# Patient Record
Sex: MALE | Race: WHITE | NOT HISPANIC OR LATINO | Employment: OTHER | URBAN - METROPOLITAN AREA
[De-identification: names, ages, dates, MRNs, and addresses within clinical notes are randomized per-mention and may not be internally consistent; named-entity substitution may affect disease eponyms.]

---

## 2019-10-02 ENCOUNTER — HOSPITAL ENCOUNTER (OUTPATIENT)
Dept: SLEEP MEDICINE | Facility: HOSPITAL | Age: 54
Discharge: HOME | End: 2019-10-02
Attending: INTERNAL MEDICINE

## 2019-10-02 ENCOUNTER — OUTSIDE FACILITY SERVICE (OUTPATIENT)
Dept: SLEEP MEDICINE | Facility: HOSPITAL | Age: 54
End: 2019-10-02

## 2019-10-02 PROCEDURE — 99204 OFFICE O/P NEW MOD 45 MIN: CPT | Performed by: INTERNAL MEDICINE

## 2019-10-23 ENCOUNTER — HOSPITAL ENCOUNTER (OUTPATIENT)
Dept: SLEEP MEDICINE | Facility: HOSPITAL | Age: 54
Discharge: HOME | End: 2019-10-23
Attending: INTERNAL MEDICINE

## 2019-10-28 ENCOUNTER — OUTSIDE FACILITY SERVICE (OUTPATIENT)
Dept: SLEEP MEDICINE | Facility: HOSPITAL | Age: 54
End: 2019-10-28

## 2019-10-28 PROCEDURE — 95811 POLYSOM 6/>YRS CPAP 4/> PARM: CPT | Performed by: INTERNAL MEDICINE

## 2019-12-23 ENCOUNTER — OUTSIDE FACILITY SERVICE (OUTPATIENT)
Dept: SLEEP MEDICINE | Facility: HOSPITAL | Age: 54
End: 2019-12-23

## 2019-12-23 ENCOUNTER — HOSPITAL ENCOUNTER (OUTPATIENT)
Dept: SLEEP MEDICINE | Facility: HOSPITAL | Age: 54
Discharge: HOME | End: 2019-12-23
Attending: INTERNAL MEDICINE

## 2019-12-23 PROCEDURE — 99213 OFFICE O/P EST LOW 20 MIN: CPT | Performed by: INTERNAL MEDICINE

## 2020-03-09 ENCOUNTER — CONVERSION ENCOUNTER (OUTPATIENT)
Dept: SURGERY | Facility: CLINIC | Age: 55
End: 2020-03-09

## 2020-03-09 ENCOUNTER — OFFICE VISIT CONVERTED (OUTPATIENT)
Dept: SURGERY | Facility: CLINIC | Age: 55
End: 2020-03-09
Attending: SURGERY

## 2020-04-29 ENCOUNTER — CONVERSION ENCOUNTER (OUTPATIENT)
Dept: CARDIOLOGY | Facility: CLINIC | Age: 55
End: 2020-04-29

## 2020-04-29 ENCOUNTER — OFFICE VISIT CONVERTED (OUTPATIENT)
Dept: CARDIOLOGY | Facility: CLINIC | Age: 55
End: 2020-04-29
Attending: INTERNAL MEDICINE

## 2020-07-20 ENCOUNTER — HOSPITAL ENCOUNTER (OUTPATIENT)
Dept: GASTROENTEROLOGY | Facility: HOSPITAL | Age: 55
Discharge: HOME | End: 2020-07-20
Attending: SURGERY

## 2020-07-20 LAB — SARS-COV-2 RNA SPEC QL NAA+PROBE: NOT DETECTED

## 2020-07-22 ENCOUNTER — HOSPITAL ENCOUNTER (OUTPATIENT)
Dept: GASTROENTEROLOGY | Facility: HOSPITAL | Age: 55
Setting detail: HOSPITAL OUTPATIENT SURGERY
Discharge: HOME | End: 2020-07-22
Attending: SURGERY

## 2020-09-02 ENCOUNTER — HOSPITAL ENCOUNTER (OUTPATIENT)
Dept: GENERAL RADIOLOGY | Facility: HOSPITAL | Age: 55
Discharge: HOME | End: 2020-09-02
Attending: NURSE PRACTITIONER

## 2020-09-10 ENCOUNTER — OFFICE VISIT CONVERTED (OUTPATIENT)
Dept: ORTHOPEDIC SURGERY | Facility: CLINIC | Age: 55
End: 2020-09-10
Attending: ORTHOPAEDIC SURGERY

## 2020-10-02 ENCOUNTER — HOSPITAL ENCOUNTER (OUTPATIENT)
Dept: PREADMISSION TESTING | Facility: HOSPITAL | Age: 55
Discharge: HOME | End: 2020-10-02
Attending: ORTHOPAEDIC SURGERY

## 2020-10-05 LAB — SARS-COV-2 RNA SPEC QL NAA+PROBE: NOT DETECTED

## 2020-10-07 ENCOUNTER — HOSPITAL ENCOUNTER (OUTPATIENT)
Dept: PERIOP | Facility: HOSPITAL | Age: 55
Setting detail: HOSPITAL OUTPATIENT SURGERY
Discharge: HOME | End: 2020-10-07
Attending: ORTHOPAEDIC SURGERY

## 2020-10-22 ENCOUNTER — OFFICE VISIT CONVERTED (OUTPATIENT)
Dept: ORTHOPEDIC SURGERY | Facility: CLINIC | Age: 55
End: 2020-10-22
Attending: PHYSICIAN ASSISTANT

## 2020-11-12 ENCOUNTER — TELEPHONE CONVERTED (OUTPATIENT)
Dept: CARDIOLOGY | Facility: CLINIC | Age: 55
End: 2020-11-12
Attending: INTERNAL MEDICINE

## 2020-11-23 ENCOUNTER — HOSPITAL ENCOUNTER (OUTPATIENT)
Dept: CARDIOLOGY | Facility: HOSPITAL | Age: 55
Discharge: HOME | End: 2020-11-23
Attending: NURSE PRACTITIONER

## 2020-11-24 ENCOUNTER — OFFICE VISIT CONVERTED (OUTPATIENT)
Dept: ORTHOPEDIC SURGERY | Facility: CLINIC | Age: 55
End: 2020-11-24
Attending: ORTHOPAEDIC SURGERY

## 2020-12-17 ENCOUNTER — OFFICE VISIT CONVERTED (OUTPATIENT)
Dept: ORTHOPEDIC SURGERY | Facility: CLINIC | Age: 55
End: 2020-12-17
Attending: PHYSICIAN ASSISTANT

## 2021-03-09 ENCOUNTER — OFFICE VISIT CONVERTED (OUTPATIENT)
Dept: OTOLARYNGOLOGY | Facility: CLINIC | Age: 56
End: 2021-03-09
Attending: NURSE PRACTITIONER

## 2021-04-06 ENCOUNTER — OFFICE VISIT CONVERTED (OUTPATIENT)
Dept: OTOLARYNGOLOGY | Facility: CLINIC | Age: 56
End: 2021-04-06
Attending: NURSE PRACTITIONER

## 2021-05-10 ENCOUNTER — OFFICE VISIT CONVERTED (OUTPATIENT)
Dept: CARDIOLOGY | Facility: CLINIC | Age: 56
End: 2021-05-10
Attending: INTERNAL MEDICINE

## 2021-05-10 NOTE — H&P
History and Physical      Patient Name: Anival Arevalo   Patient ID: [id number]   Sex: Male   YOB: 1965    Primary Care Provider: Eun Carrillo NP   Referring Provider: Eun Carrillo NP    Visit Date: April 29, 2020    Provider: Tha Coppola MD   Location: [facility]   Location Address: [address] [place] [address], [facility]   [place], [place]  [id number]   Location Phone: [phone]          Chief Complaint  · Establish cardiac care.      History Of Present Illness  Consult requested by: Eun Carrillo NP   Anival Arevalo is a 55 year old /White male with known CAD and congestive heart failure who is establishing a cardiologist in the area. The patient has been doing well symptom-wise. Denies any new ongoing complaints. Still with intermittent chest discomfort, brief in duration. No anginal discomfort. Also, patient has stable chronic lower extremity edema and shortness of breath with activity.   PAST MEDICAL HISTORY: Coronary artery disease with an MI in 2013 with PCI and stenting in 2013 to the RCA and 2014; History of Hypertension; Dyslipidemia; Systolic congestive heart failure, most recent ejection fraction of 36% on the nuclear stress test in July 2019.   FAMILY MEDICAL HISTORY: Significant for premature coronary atherosclerotic disease on patient's dad's side.   PSYCHOSOCIAL HISTORY: Prior smoker. Does not use alcohol. Caffeine daily. . Admits mood changes and depression. Gets 7,500 steps per day plus.   CURRENT MEDICATIONS: Rosuvastatin 40 mg daily; indapamide 1.25 mg daily; Ventolin HFA p.r.n.; carvedilol 3.125 mg b.i.d.; montelukast 10 mg daily; sublingual nitroglycerin 0.4 mg p.r.n.; aspirin 81 mg daily; lisinopril 5 mg daily; vitamin D daily. The dosage and frequency of the medications were reviewed with the patient.   ALLERGIES: Zithromax/Z-Conrado.       Review of Systems  · Constitutional  o Admits  o : good general health lately  o Denies  o : fatigue, recent  "weight changes   · Eyes  o Admits  o : blurred vision  · HENT  o Denies  o : hearing loss or ringing, chronic sinus problem, swollen glands in neck  · Cardiovascular  o Admits  o : chest pain, swelling (feet, ankles, hands), shortness of breath with activities   o Denies  o : palpitations (fast, fluttering, or skipping beats)  · Respiratory  o Admits  o : asthma or wheezing, COPD  · Gastrointestinal  o Denies  o : ulcers, nausea or vomiting  · Neurologic  o Denies  o : lightheaded or dizzy, stroke, headaches  · Musculoskeletal  o Admits  o : joint pain, back pain  · Endocrine  o Denies  o : thyroid disease, diabetes, heat or cold intolerance, excessive thirst or urination  · Heme-Lymph  o Admits  o : bleeding or bruising tendency  o Denies  o : anemia      Vitals  Date Time BP Position Site L\R Cuff Size HR RR TEMP (F) WT  HT  BMI kg/m2 BSA m2 O2 Sat HC       04/29/2020 09:00 /86 Sitting    72 - R   223lbs 16oz 5'  8\" 34.06 2.21           Physical Examination  · Constitutional  o Appearance  o : Awake, alert, in no acute distress.   · Head and Face  o HEENT  o : PERRLA.  · Eyes  o Conjunctivae  o : Normal.  · Ears, Nose, Mouth and Throat  o Oral Cavity  o :   § Oral Mucosa  § : Normal.  · Neck  o Inspection/Palpation  o : No JVD.  · Respiratory  o Respiratory  o : Chest is symmetrical. Lung fields are clear. No rhonchi or wheezes heard.  · Cardiovascular  o Heart  o :   § Auscultation of Heart  § : S1, S2 normal. Regular rate and rhythm without murmurs, gallops, or rubs.  o Peripheral Vascular System  o :   § Extremities  § : Nails normal. No clubbing or cyanosis. Femoral pulses adequate. Pedal pulses adequate. No peripheral edema.  · Gastrointestinal  o Abdominal Examination  o : Abdomen soft. No masses. No guarding or rigidity. No hepatosplenomegaly. Bowel sounds normal.  · Musculoskeletal  o General  o :   § General Musculoskeletal  § : Muscle tone and strength were normal.  · Skin and Subcutaneous " Tissue  o General Inspection  o : Unremarkable.  · EKG  o EKG  o : Performed in the office today.  o Indications  o : Coronary artery disease.  o Results  o : Normal sinus rhythm with borderline nonspecific intraventricular conduction delay and evidence of old inferior wall MI.  o Comparison  o : No prior EKG to compare to.   · Diagnostic Testing  o Diagnostic Testing  o : Stress test done in July 2019 showed no reversible ischemic defect with an inferior apical infarct pattern, and on gated imaging, an EF of 36%. Heart catherization in 2013 showed a reduced ejection fraction of 40% and severe disease involving the RCA which was stented. There was nonobstructive disease in the LAD and circumflex arteries.           Assessment     ASSESSMENT & PLAN:    1.  Coronary artery disease with prior stenting.  No ongoing anginal chest discomfort.  Recent stress test in the        last year showing no ischemic defects, just prior infarct.  Recommended continued chronic aspirin 81 mg        once a day.  2.  Dyslipidemia, on high-intensity statin.  Still awaiting his lab results to see if further hyperlipidemia        medication is needed.  Patient sounds like he may have had a reaction to Zetia in the past before.  If so,        Repatha may be the only choice if further lowering needed.  3.  Congestive heart failure, ischemic in nature.  Ejection fraction has been borderline for ICD in the past on        stress testing reportedly and on echocardiogram done last year.  Will see if this can be obtained.  If not,        may need to reassess on followup visits in the future.             Electronically Signed by: Jackie Costa-, Other -Author on May 4, 2020 11:53:07 AM  Electronically Co-signed by: Tha Coppola MD -Reviewer on May 5, 2020 08:59:21 AM

## 2021-05-10 NOTE — H&P
History and Physical      Patient Name: Anival Arevalo   Patient ID: [id number]   Sex: Male   YOB: 1965    Primary Care Provider: Eun Carrillo NP   Referring Provider: Eun Carrillo NP    Visit Date: September 10, 2020    Provider: Rona Tsai MD   Location: [facility]   Location Address: [address] [place] [address]  [id number]   Location Phone: [phone]          Chief Complaint  · Left knee pain      History Of Present Illness  Anival Arevalo is a 55 year old /White male who presents today to [facility]. He is here for evaluation of his left knee. He has been having problems for the last 3 years, twisted his knee. He has lateral meniscal tear. He has had shots that have helped a little bit, but they reoccurred.       Past Medical History  Allergic rhinitis, chronic; Arthritis; Bladder Disorder; Cancer; Chest pain; Chronic Obstructive Pulmonary Disease; Congestive heart failure; GERD; Heart Attack; Heart Disease; High blood pressure; High cholesterol; Hyperlipemia; Hypertension; Kidney Disease; Limb Swelling; Lung disease; Seasonal allergies; Shortness Of Air; Shortness of Breath         Past Surgical History  Cardiac; Colonoscopy; Eye Implant; Gallbladder; heart surgery; Kidney         Medication List  aspirin 81 mg oral tablet,delayed release (DR/EC); carvedilol 3.125 mg oral tablet; indapamide 1.25 mg oral tablet; montelukast 10 mg oral tablet; Nitrostat 0.4 mg sublingual tablet, sublingual; rosuvastatin 40 mg oral tablet; Ventolin HFA 90 mcg/actuation inhalation HFA aerosol inhaler; Vitamin D3 125 mcg (5,000 unit) oral tablet         Allergy List  Zetia; Zithromax Z-Conrado         Family Medical History  Heart Disease; Cancer, Unspecified; Diabetes, unspecified type; Family history of Arthritis         Social History  Alcohol Use (Never); lives with children; lives with spouse; .; Recreational Drug Use (Never); Retired.; Tobacco (Former)  "        Review of Systems  · Constitutional  o Denies  o : fever, chills, weight loss  · Cardiovascular  o Denies  o : chest pain, shortness of breath  · Gastrointestinal  o Denies  o : liver disease, heartburn, nausea, blood in stools  · Genitourinary  o Denies  o : painful urination, blood in urine  · Integument  o Denies  o : rash, itching  · Neurologic  o Denies  o : headache, weakness, loss of consciousness  · Musculoskeletal  o Denies  o : painful, swollen joints  · Psychiatric  o Denies  o : drug/alcohol addiction, anxiety, depression      Vitals  Date Time BP Position Site L\R Cuff Size HR RR TEMP (F) WT  HT  BMI kg/m2 BSA m2 O2 Sat        09/10/2020 02:22 PM      96 - R   213lbs 16oz 5'  9\" 31.6 2.17 97 %          Physical Examination  · Constitutional  o Appearance  o : well developed, well-nourished, no obvious deformities present  · Head and Face  o Head  o :   § Inspection  § : normocephalic  o Face  o :   § Inspection  § : no facial lesions  · Eyes  o Conjunctivae  o : conjunctivae normal  o Sclerae  o : sclerae white  · Ears, Nose, Mouth and Throat  o Ears  o :   § External Ears  § : appearance within normal limits  § Hearing  § : intact  o Nose  o :   § External Nose  § : appearance normal  · Neck  o Inspection/Palpation  o : normal appearance  o Range of Motion  o : full range of motion  · Respiratory  o Respiratory Effort  o : breathing unlabored  o Inspection of Chest  o : normal appearance  o Auscultation of Lungs  o : no audible wheezing or rales  · Cardiovascular  o Heart  o : regular rate  · Gastrointestinal  o Abdominal Examination  o : soft and non-tender  · Skin and Subcutaneous Tissue  o General Inspection  o : intact, no rashes  · Psychiatric  o General  o : Alert and oriented x3  o Judgement and Insight  o : judgment and insight intact  o Mood and Affect  o : mood normal, affect appropriate  · Left Knee  o Inspection  o : Sensation intact. Pulse is normal. Affect is pleasant. He has " joint line tenderness. Positive Gabby.           Assessment  · Primary osteoarthritis of left knee     715.16/M17.12  · Pain: Left knee     719.46/M25.569      Plan  · Medications  o Medications have been Reconciled  o Transition of Care or Provider Policy  · Instructions  o Reviewed the patient's Past Medical, Social, and Family history as well as the ROS at today's visit, no changes.  o Call or return if worsening symptoms.  o Discussed surgery.  o Risks/benefits discussed with patient including, but not limited to: infection, bleeding, neurovascular damage, malunion, nonunion, aesthetic deformity, need for further surgery, and death.  o Surgery pamphlet given.  o This note is transcribed by Isha Trevizo /sriram  o Going to set him up for a knee scope and see him back after that.             Electronically Signed by: Isha Trevizo, -Author on September 11, 2020 10:02:27 AM  Electronically Co-signed by: Rona Tsai MD -Reviewer on September 11, 2020 05:23:09 PM

## 2021-05-10 NOTE — H&P
"   History and Physical      Patient Name: Anival Arevalo   Patient ID: [id number]   Sex: Male   YOB: 1965    Primary Care Provider: Eun Carrillo NP   Referring Provider: Eun Carrillo NP    Visit Date: March 9, 2021    Provider: RUI Morin   Location: [facility]   Location Address: [address] [place] [address], [address] [place] [address]  [id number]   Location Phone: [phone]          Chief Complaint     1.  Left ear fullness    2.  Hearing loss       History Of Present Illness  Anival Arevalo is a 55 year old /White male who presents to the office today as a consult from Eun Carrillo NP.      He presents the clinic today for evaluation of his left ear.  He states that for many years he has felt intermittent issues with fullness in his left ear.  He states that over the past 3 months he feels like this has gotten worse.  He states he can feel fluid moving around in his left ear and he feels as though he cannot hear as well out of that ear.  He states that he feels very congested in the morning when he wakes up but is usually not able to blow much out of his nose.  He states that he does feel like he has some hearing loss on the right side from loud noise exposure many years ago.  However, he has not had an audiogram in many years.  He states that he was using daily sinus rinses and felt like this was helping but stopped using this approximately a month ago.  Now he is using a saline gel spray in his nose each day.  He feels like his ear pops frequently and will occasionally \"clear\" but then returned to feeling clogged up again.  He is not on any intranasal steroid sprays.       Past Medical History  Allergic rhinitis, chronic; Arthritis; Bladder disorder; Cancer; Chest pain; Chronic Obstructive Pulmonary Disease; Chronic serous otitis media, left ear; Congestive heart failure; GERD; Heart Attack; Heart Disease; High blood pressure; High cholesterol; Hyperlipemia; " "Hypertension; Kidney Disease; Limb Swelling; Lung disease; Seasonal allergies; Shortness Of Air; Shortness of Breath         Past Surgical History  Cardiac; Colonoscopy; Eye Implant; Gallbladder; heart surgery; Kidney; Knee surgery         Medication List  aspirin 81 mg oral tablet,delayed release (DR/EC); carvedilol 3.125 mg oral tablet; diclofenac sodium 75 mg oral tablet,delayed release (DR/EC); hydrochlorothiazide 12.5 mg oral tablet; indapamide 1.25 mg oral tablet; lisinopril 5 mg oral tablet; montelukast 10 mg oral tablet; rosuvastatin 40 mg oral tablet; Vitamin D3 125 mcg (5,000 unit) oral tablet; Zyrtec 10 mg oral tablet         Allergy List  Zetia; Zithromax Z-Conrado         Family Medical History  Family history of Arthritis; Family history of cancer; Family history of heart disease; Family history of diabetes mellitus         Social History  Alcohol Use (Never); lives with children; lives with spouse; .; Recreational Drug Use (Never); Retired.; Tobacco (Former)         Review of Systems  · Constitutional  o Denies  o : fever, night sweats, weight loss  · Eyes  o Denies  o : discharge from eye, impaired vision  · HENT  o Admits  o : *See HPI  · Cardiovascular  o Denies  o : chest pain, irregular heart beats  · Respiratory  o Admits  o : wheezing  o Denies  o : shortness of breath, coughing up blood  · Gastrointestinal  o Denies  o : heartburn, reflux, vomiting blood  · Genitourinary  o Denies  o : frequency  · Integument  o Admits  o : skin dryness  o Denies  o : rash  · Neurologic  o Denies  o : seizures, loss of balance, loss of consciousness, dizziness  · Endocrine  o Admits  o : heat intolerance  o Denies  o : cold intolerance  · Heme-Lymph  o Admits  o : easy bleeding  o Denies  o : anemia      Vitals  Date Time BP Position Site L\R Cuff Size HR RR TEMP (F) WT  HT  BMI kg/m2 BSA m2 O2 Sat FR L/min FiO2 HC       03/09/2021 09:33 AM        97.6 221lbs 8oz 5'  9\" 32.71 2.21             Physical " Examination  · Constitutional  o Appearance  o : well developed, well-nourished, alert and in no acute distress, voice clear and strong  · Head and Face  o Head  o :   § Inspection  § : no deformities or lesions  o Face  o :   § Inspection  § : No facial lesions; House-Brackmann I/VI bilaterally  § Palpation  § : No TMJ crepitus nor  muscle tenderness bilaterally  · Eyes  o Vision  o :   § Visual Fields  § : Extraocular movements are intact. No spontaneous or gaze-induced nystagmus.  o Conjunctivae  o : clear  o Sclerae  o : clear  o Pupils and Irises  o : pupils equal, round, and reactive to light.   · Ears, Nose, Mouth and Throat  o Ears  o :   § External Ears  § : appearance within normal limits, no lesions present  § Otoscopic Examination  § : Left tympanoic membrane dull, serous effusion, right tympanic membrane normal in appearance, without perforation, middle ear well aerated  § Hearing  § : intact to conversational voice both ears. Tuning fork testing: Corado: No lateralization. Rinne: Positive bilaterally.  o Nose  o :   § External Nose  § : appearance normal  § Intranasal Exam  § : mucosa within normal limits, vestibules normal, no intranasal lesions present, septum midline, sinuses non tender to percussion  o Oral Cavity  o :   § Oral Mucosa  § : oral mucosa normal without pallor or cyanosis  § Lips  § : lip appearance normal  § Teeth  § : normal dentition for age  § Gums  § : gums pink, non-swollen, no bleeding present  § Tongue  § : tongue appearance normal; normal mobility  § Palate  § : hard palate normal, soft palate appearance normal with symmetric mobility  o Throat  o :   § Oropharynx  § : no inflammation or lesions present, tonsils within normal limits  · Neck  o Inspection/Palpation  o : normal appearance, no masses or tenderness, trachea midline; thyroid size normal, nontender, no nodules or masses present on palpation  · Respiratory  o Respiratory Effort  o : breathing  unlabored  o Inspection of Chest  o : normal appearance, no retractions  · Lymphatic  o Neck  o : no lymphadenopathy present  o Supraclavicular Nodes  o : no lymphadenopathy present  o Preauricular Nodes  o : no lymphadenopathy present  · Skin and Subcutaneous Tissue  o General Inspection  o : Regarding face and neck - there are no rashes present, no lesions present, and no areas of discoloration  · Neurologic  o Cranial Nerves  o : cranial nerves II-XII are grossly intact bilaterally  o Gait and Station  o : normal gait, able to stand without diffculty  · Psychiatric  o Judgement and Insight  o : judgment and insight intact  o Mood and Affect  o : mood normal, affect appropriate          Assessment  · Hearing loss     389.9/H91.90  · Acute serous otitis media of left ear without rupture     381.01/H65.02      Plan  · Orders  o Audiometry, pure-tone (threshold); [facility] (39699) - 389.9/H91.90, 381.01/H65.02 - 03/30/2021  o Tympanogram (Impedance Testing) [facility] ([address]281) - 389.9/H91.90, 381.01/H65.02 - 03/30/2021  · Medications  o fluticasone propionate 50 mcg/actuation nasal spray,suspension   SIG: spray 1 - 2 sprays (50 - 100 mcg) in each nostril by intranasal route once daily for 30 days   DISP: (1) Bottle with 0 refills  Prescribed on 03/09/2021     o prednisone 20 mg oral tablet   SIG: take 1 tablet (20 mg) by oral route 2 times per day for 5 days   DISP: (10) Tablet with 0 refills  Prescribed on 03/09/2021     o Medications have been Reconciled  o Transition of Care or Provider Policy  · Instructions  o He presents the clinic today for evaluation of his left ear. He states that for many years he has felt intermittent issues with fullness in his left ear. He states that over the past 3 months he feels like this has gotten worse. He states he can feel fluid moving around in his left ear and he feels as though he cannot hear as well out of that ear. He states that he feels very congested in the morning when he  "wakes up but is usually not able to blow much out of his nose. He states that he does feel like he has some hearing loss on the right side from loud noise exposure many years ago. However, he has not had an audiogram in many years. He states that he was using daily sinus rinses and felt like this was helping but stopped using this approximately a month ago. Now he is using a saline gel spray in his nose each day. He feels like his ear pops frequently and will occasionally \"clear\" but then returned to feeling clogged up again. He is not on any intranasal steroid sprays. On examination today left tympanic membrane is dull with what appears to be a serous effusion. Right tympanic membrane is normal in appearance without perforation. Middle ear is well aerated. Tuning fork testing is normal with Corado showing no lateralization and Rinne positive bilaterally. His nose is free of any polyps or purulence. He does not have any nasal obstruction today. Going to start him using his nasal saline rinses again each night and can add fluticasone to use after the rinse. I am going to have him use politzerization techniques to help open up the eustachian tube and clear the middle ear. I would like to see him back in 3 to 4 weeks to obtain an audiogram and tympanogram to assess his hearing loss and eustachian tube function.  o Electronically Identified Patient Education Materials Provided Electronically  · Correspondence  o ENT Letter to Referring MD (Eun Carrillo NP) - 03/09/2021            Electronically Signed by: RUI Morin -Author on March 9, 2021 10:34:45 AM  "

## 2021-05-13 NOTE — PROGRESS NOTES
"   Progress Note      Patient Name: Anival Arevalo   Patient ID: [id number]   Sex: Male   YOB: 1965    Primary Care Provider: Eun Carrillo NP   Referring Provider: Eun Carrillo NP    Visit Date: November 12, 2020    Provider: Tha Coppola MD   Location: [facility]   Location Address: [address] [place] [address], [address]   [place]  [id number]   Location Phone: [phone]          Chief Complaint     Coronary artery disease.       History Of Present Illness  Video Conferencing Visit  Anival Arevalo is a 55 year old /White male with a known history of CAD, congestive heart failure, hypertension, and dyslipidemia who has been doing very well. Blood pressure tends to run on the lower side, he states, but no complaints or problems. Evaluation via video conferencing. Verbal consent obtained before beginning visit.   The following staff were present during this visit: Provider only.      PAST MEDICAL HISTORY:  Coronary artery disease with an MI in 2013 with PCI and stenting in 2013 to the RCA and 2014; History of Hypertension; Dyslipidemia; Systolic congestive heart failure, most recent ejection fraction of 36% on the nuclear stress test in July 2019.       CURRENT MEDICATIONS:  Hydrochlorothiazide 12.5 mg daily; rosuvastatin 40 mg daily; indapamide 1.25 mg daily; carvedilol 3.125 mg b.i.d.; aspirin 81 mg daily; lisinopril 5 mg daily; nitroglycerin 0.4 mg p.r.n.; Ventolin HFA p.r.n.; montelukast 10 mg daily; vitamin B12 2500 mcg every other day; vitamin D daily.       Vitals     Per patient, at-home vitals:  /68.  Weight 217.  Height 5'8\".       Physical Examination  · EKG  o EKG  o : Previous EKG showed normal sinus rhythm with borderline intraventricular conduction delay.  · Labs  o Labs  o : Most recent LDL cholesterol was 76 here, but patient states that he had another one done recently and it had improved.           Assessment     ASSESSMENT & PLAN:    1.  Coronary artery " disease with prior stenting, no angina, on chronic aspirin 81 mg once a day.  2.  Hyperlipidemia.  Patient is on statin, and allergic to Zetia.  Changed his dosing to nighttime and states that it        had been below 70.  Will attempt to obtain these results.     3.  Hypertension, controlled.             Electronically Signed by: Jackie Costa-, Other -Author on November 17, 2020 02:18:12 PM  Electronically Co-signed by: Tha Coppola MD -Reviewer on November 18, 2020 09:37:49 AM

## 2021-05-13 NOTE — PROGRESS NOTES
Progress Note      Patient Name: Anival Arevalo   Patient ID: [id number]   Sex: Male   YOB: 1965    Primary Care Provider: uEn Carrillo NP   Referring Provider: Eun Carrillo NP    Visit Date: October 22, 2020    Provider: Amara Melvin PA-C   Location: [facility]   Location Address: [address] [place] [address]  [id number]   Location Phone: [phone]          Chief Complaint  · Surgery follow up left knee arthroscopy      History Of Present Illness  Anival Arevalo is a 55 year old /White male who presents today to [facility].      He is s/p left knee arthroscopic pMMT, pLMT 10/7/20 by Dr. Tsai. He is doing well. He states he did all the PT he was going to do. He states posterior knee swelling.       Past Medical History  Allergic rhinitis, chronic; Arthritis; Bladder Disorder; Cancer; Chest pain; Chronic Obstructive Pulmonary Disease; Congestive heart failure; GERD; Heart Attack; Heart Disease; High blood pressure; High cholesterol; Hyperlipemia; Hypertension; Kidney Disease; Limb Swelling; Lung disease; Seasonal allergies; Shortness Of Air; Shortness of Breath         Past Surgical History  Cardiac; Colonoscopy; Eye Implant; Gallbladder; heart surgery; Kidney         Medication List  aspirin 81 mg oral tablet,delayed release (DR/EC); carvedilol 3.125 mg oral tablet; indapamide 1.25 mg oral tablet; montelukast 10 mg oral tablet; Nitrostat 0.4 mg sublingual tablet, sublingual; rosuvastatin 40 mg oral tablet; Ventolin HFA 90 mcg/actuation inhalation HFA aerosol inhaler; Vitamin D3 125 mcg (5,000 unit) oral tablet         Allergy List  Zetia; Zithromax Z-Conrado       Allergies Reconciled  Family Medical History  Heart Disease; Cancer, Unspecified; Diabetes, unspecified type; Family history of Arthritis         Social History  Alcohol Use (Never); lives with children; lives with spouse; .; Recreational Drug Use (Never); Retired.; Tobacco (Former)  "        Review of Systems  · Constitutional  o Denies  o : fever, chills, weight loss  · Cardiovascular  o Denies  o : chest pain, shortness of breath  · Gastrointestinal  o Denies  o : liver disease, heartburn, nausea, blood in stools  · Genitourinary  o Denies  o : painful urination, blood in urine  · Integument  o Denies  o : rash, itching  · Neurologic  o Denies  o : headache, weakness, loss of consciousness  · Musculoskeletal  o Denies  o : painful, swollen joints  · Psychiatric  o Denies  o : drug/alcohol addiction, anxiety, depression      Vitals  Date Time BP Position Site L\R Cuff Size HR RR TEMP (F) WT  HT  BMI kg/m2 BSA m2 O2 Sat FR L/min FiO2 HC       10/22/2020 01:22 PM      71 - R   220lbs 0oz 5'  9\" 32.49 2.2 97 %            Physical Examination  · Constitutional  o Appearance  o : well developed, well-nourished, no obvious deformities present  · Head and Face  o Head  o :   § Inspection  § : normocephalic  o Face  o :   § Inspection  § : no facial lesions  · Eyes  o Conjunctivae  o : conjunctivae normal  o Sclerae  o : sclerae white  · Ears, Nose, Mouth and Throat  o Ears  o :   § External Ears  § : appearance within normal limits  § Hearing  § : intact  o Nose  o :   § External Nose  § : appearance normal  · Neck  o Inspection/Palpation  o : normal appearance  o Range of Motion  o : full range of motion  · Respiratory  o Respiratory Effort  o : breathing unlabored  o Inspection of Chest  o : normal appearance  o Auscultation of Lungs  o : no audible wheezing or rales  · Cardiovascular  o Heart  o : regular rate  · Gastrointestinal  o Abdominal Examination  o : soft and non-tender  · Skin and Subcutaneous Tissue  o General Inspection  o : intact, no rashes  · Psychiatric  o General  o : Alert and oriented x3  o Judgement and Insight  o : judgment and insight intact  o Mood and Affect  o : mood normal, affect appropriate  · Left Knee  o Inspection  o : Well approximated incisions. Full ROM. Calf " supple/nontender. Full weight bearing. Neurovascularly intact.           Assessment  · Left Aftercare following surgery of the muskuloskeletal system     V54.81      Plan  · Medications  o Medications have been Reconciled  o Transition of Care or Provider Policy  · Instructions  o Reviewed the patient's Past Medical, Social, and Family history as well as the ROS at today's visit, no changes.  o Call or return if worsening symptoms.  o Increase activity as tolerated. Follow Up PRN.  o Electronically Identified Patient Education Materials Provided Electronically            Electronically Signed by: Amara Melvin PA-C -Author on October 22, 2020 01:37:19 PM

## 2021-05-13 NOTE — PROGRESS NOTES
Progress Note      Patient Name: Anival Arevalo   Patient ID: [id number]   Sex: Male   YOB: 1965    Primary Care Provider: Eun Carrillo NP   Referring Provider: Eun Carrillo NP    Visit Date: November 24, 2020    Provider: Rona Tsai MD   Location: [facility]   Location Address: [address] [place] [address]  [id number]   Location Phone: [phone]          Chief Complaint  · Left Knee Pain      History Of Present Illness  Anival Arevalo is a 55 year old /White male who presents today to [facility].      Patient presents today with a follow-up of left knee pain.  He is s/p left knee arthroscopic pMMT, pLMT 10/7/20 by Dr. Tsai. Patient has results of his ultrasound on his calf/knee that revealed negative study for acute deep venous thrombosis and left medial knee nonvascular cystic mass. He states aside from that he is doing well.       Past Medical History  Allergic rhinitis, chronic; Arthritis; Bladder Disorder; Cancer; Chest pain; Chronic Obstructive Pulmonary Disease; Congestive heart failure; GERD; Heart Attack; Heart Disease; High blood pressure; High cholesterol; Hyperlipemia; Hypertension; Kidney Disease; Limb Swelling; Lung disease; Seasonal allergies; Shortness Of Air; Shortness of Breath         Past Surgical History  Cardiac; Colonoscopy; Eye Implant; Gallbladder; heart surgery; Kidney         Medication List  aspirin 81 mg oral tablet,delayed release (DR/EC); carvedilol 3.125 mg oral tablet; diclofenac sodium 75 mg oral tablet,delayed release (DR/EC); indapamide 1.25 mg oral tablet; montelukast 10 mg oral tablet; Nitrostat 0.4 mg sublingual tablet, sublingual; rosuvastatin 40 mg oral tablet; Ventolin HFA 90 mcg/actuation inhalation HFA aerosol inhaler; Vitamin D3 125 mcg (5,000 unit) oral tablet         Allergy List  Zetia; Zithromax Z-Conrado       Allergies Reconciled  Family Medical History  Heart Disease; Cancer, Unspecified; Diabetes,  "unspecified type; Family history of Arthritis         Social History  Alcohol Use (Never); lives with children; lives with spouse; .; Recreational Drug Use (Never); Retired.; Tobacco (Former)         Review of Systems  · Constitutional  o Denies  o : fever, chills, weight loss  · Cardiovascular  o Denies  o : chest pain, shortness of breath  · Gastrointestinal  o Denies  o : liver disease, heartburn, nausea, blood in stools  · Genitourinary  o Denies  o : painful urination, blood in urine  · Integument  o Denies  o : rash, itching  · Neurologic  o Denies  o : headache, weakness, loss of consciousness  · Musculoskeletal  o Denies  o : painful, swollen joints  · Psychiatric  o Denies  o : drug/alcohol addiction, anxiety, depression      Vitals  Date Time BP Position Site L\R Cuff Size HR RR TEMP (F) WT  HT  BMI kg/m2 BSA m2 O2 Sat FR L/min FiO2        11/24/2020 02:17 PM         220lbs 0oz 5'  9\" 32.49 2.2             Physical Examination  · Constitutional  o Appearance  o : well developed, well-nourished, no obvious deformities present  · Head and Face  o Head  o :   § Inspection  § : normocephalic  o Face  o :   § Inspection  § : no facial lesions  · Eyes  o Conjunctivae  o : conjunctivae normal  o Sclerae  o : sclerae white  · Ears, Nose, Mouth and Throat  o Ears  o :   § External Ears  § : appearance within normal limits  § Hearing  § : intact  o Nose  o :   § External Nose  § : appearance normal  · Neck  o Inspection/Palpation  o : normal appearance  o Range of Motion  o : full range of motion  · Respiratory  o Respiratory Effort  o : breathing unlabored  o Inspection of Chest  o : normal appearance  o Auscultation of Lungs  o : no audible wheezing or rales  · Cardiovascular  o Heart  o : regular rate  · Gastrointestinal  o Abdominal Examination  o : soft and non-tender  · Skin and Subcutaneous Tissue  o General Inspection  o : intact, no rashes  · Psychiatric  o General  o : Alert and oriented " x3  o Judgement and Insight  o : judgment and insight intact  o Mood and Affect  o : mood normal, affect appropriate  · Left Knee  o Inspection  o : Sensation grossly intact. Neurovascular intact. Skin intact. Well approximated incisions. Full flexion and extension. Full weightbearing. No swelling, skin discoloration or atrophy. Lump on the calf, mildly tender. No signs of DVT revealed by ultrasound. Dorsal Pedal Pulse 2+, posterior tibialis pulse 2+.   · Imaging  o Imaging  o : 11/23/20 Ultrasound: 1. This study is negative for an acute deep venous thrombosis in the left lower extremity. 2. Left medial knee nonvascular cystic mass is noted above, of uncertain etiology. Clinical and/or alternative imaging correlation would be appropriate.           Assessment  · Left knee pain, unspecified chronicity     719.46/M25.562  · Left Knee cystic mass     717.5/M23.012      Plan  · Medications  o Medications have been Reconciled  o Transition of Care or Provider Policy  · Instructions  o Dr. Tsai saw and examined the patient and agrees with plan.   o Ultrasound reviewed by Dr. Tsai.  o Reviewed the patient's Past Medical, Social, and Family history as well as the ROS at today's visit, no changes.  o Call or return if worsening symptoms.  o Follow up in 1 month.  o This note was transcribed by Krysten Coleman.   o Discussed diagnosis and treatment options with the patient. Patient put onto an antibiotic and will continue to work on gentle ROM.            Electronically Signed by: Krysten Coleman-, Other -Author on November 30, 2020 09:41:57 AM  Electronically Co-signed by: Rona Tsai MD -Reviewer on December 1, 2020 01:11:52 PM

## 2021-05-14 VITALS — HEIGHT: 69 IN | WEIGHT: 214.5 LBS | TEMPERATURE: 97.5 F | BODY MASS INDEX: 31.77 KG/M2

## 2021-05-14 VITALS — HEIGHT: 69 IN | BODY MASS INDEX: 32.58 KG/M2 | WEIGHT: 220 LBS | HEART RATE: 71 BPM | OXYGEN SATURATION: 97 %

## 2021-05-14 VITALS — HEART RATE: 82 BPM | WEIGHT: 222 LBS | HEIGHT: 69 IN | OXYGEN SATURATION: 99 % | BODY MASS INDEX: 32.88 KG/M2

## 2021-05-14 VITALS — BODY MASS INDEX: 31.7 KG/M2 | OXYGEN SATURATION: 97 % | WEIGHT: 214 LBS | HEART RATE: 96 BPM | HEIGHT: 69 IN

## 2021-05-14 VITALS — HEIGHT: 69 IN | WEIGHT: 221.5 LBS | TEMPERATURE: 97.6 F | BODY MASS INDEX: 32.81 KG/M2

## 2021-05-14 VITALS — WEIGHT: 220 LBS | HEIGHT: 69 IN | BODY MASS INDEX: 32.58 KG/M2

## 2021-05-14 NOTE — PROGRESS NOTES
Progress Note      Patient Name: Anival Arevalo   Patient ID: [id number]   Sex: Male   YOB: 1965    Primary Care Provider: Eun Carrillo NP   Referring Provider: Eun Carrillo NP    Visit Date: April 6, 2021    Provider: RUI Morin   Location: [facility]   Location Address: [address] [place] [address], [address]  [id number]   Location Phone: [phone]          Chief Complaint     1. Hearing loss    2.  Left ear fullness       History Of Present Illness  Anival Arevalo is a 55 year old /White male who presents to the office today for a follow-up visit.      He presents to the clinic today for 4-week follow-up regarding hearing loss and left ear fullness.  When he was last seen on 3/9/2021 he has been experiencing 3 months of left-sided ear fullness and hearing loss.  On exam he had a left-sided effusion.  Started on a course of antibiotics, Flonase and daily nasal rinses.  He states that he did the rinses for about 2 weeks but then started having burning in his nose so he has been doing them less frequently.  However, he states that he is feeling much better.  He states that his ears are much improved and he is using the Flonase daily.  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  He states that he has had trouble hearing for many years and his wife often complains that he cannot hear her voice.  He returns today for an audiogram.       Past Medical History  Allergic rhinitis, chronic; Arthritis; Bladder disorder; Cancer; Chest pain; Chronic Obstructive Pulmonary Disease; Chronic serous otitis media, left ear; Congestive heart failure; GERD; Heart Attack; Heart Disease; High blood pressure; High cholesterol; Hyperlipemia; Hypertension; Kidney Disease; Limb Swelling; Lung disease; Seasonal allergies; Shortness Of Air; Shortness of Breath         Past Surgical History  Cardiac; Colonoscopy; Eye  "Implant; Gallbladder; heart surgery; Kidney; Knee surgery         Medication List  aspirin 81 mg oral tablet,delayed release (DR/EC); carvedilol 3.125 mg oral tablet; diclofenac sodium 75 mg oral tablet,delayed release (DR/EC); fluticasone propionate 50 mcg/actuation nasal spray,suspension; hydrochlorothiazide 12.5 mg oral tablet; indapamide 1.25 mg oral tablet; lisinopril 5 mg oral tablet; montelukast 10 mg oral tablet; rosuvastatin 40 mg oral tablet; Vitamin D3 125 mcg (5,000 unit) oral tablet; Zyrtec 10 mg oral tablet         Allergy List  Zetia; Zithromax Z-Conrado         Family Medical History  Family history of Arthritis; Family history of cancer; Family history of heart disease; Family history of diabetes mellitus         Social History  Alcohol Use (Never); lives with children; lives with spouse; .; Recreational Drug Use (Never); Retired.; Tobacco (Former)         Review of Systems  · Constitutional  o Denies  o : fever, night sweats, weight loss  · Eyes  o Denies  o : discharge from eye, impaired vision  · HENT  o Admits  o : *See HPI  · Cardiovascular  o Denies  o : chest pain, irregular heart beats  · Respiratory  o Denies  o : shortness of breath, wheezing, coughing up blood  · Gastrointestinal  o Denies  o : heartburn, reflux, vomiting blood  · Genitourinary  o Denies  o : frequency  · Integument  o Denies  o : rash, skin dryness  · Neurologic  o Denies  o : seizures, loss of balance, loss of consciousness, dizziness  · Endocrine  o Denies  o : cold intolerance, heat intolerance  · Heme-Lymph  o Denies  o : easy bleeding, anemia      Vitals  Date Time BP Position Site L\R Cuff Size HR RR TEMP (F) WT  HT  BMI kg/m2 BSA m2 O2 Sat FR L/min FiO2 HC       04/06/2021 09:25 AM        97.5 214lbs 8oz 5'  9\" 31.68 2.18             Physical Examination  · Constitutional  o Appearance  o : well developed, well-nourished, alert and in no acute distress, voice clear and strong  · Head and Face  o Head  o : "   § Inspection  § : no deformities or lesions  o Face  o :   § Inspection  § : No facial lesions; House-Brackmann I/VI bilaterally  § Palpation  § : No TMJ crepitus nor  muscle tenderness bilaterally  · Eyes  o Vision  o :   § Visual Fields  § : Extraocular movements are intact. No spontaneous or gaze-induced nystagmus.  o Conjunctivae  o : clear  o Sclerae  o : clear  o Pupils and Irises  o : pupils equal, round, and reactive to light.   · Ears, Nose, Mouth and Throat  o Ears  o :   § External Ears  § : appearance within normal limits, no lesions present  § Otoscopic Examination  § : tympanic membrane appearance within normal limits bilaterally without perforations, well-aerated middle ears  § Hearing  § : intact to conversational voice both ears  o Nose  o :   § External Nose  § : appearance normal  § Intranasal Exam  § : mucosa within normal limits, vestibules normal, no intranasal lesions present, septum midline, sinuses non tender to percussion  o Oral Cavity  o :   § Oral Mucosa  § : oral mucosa normal without pallor or cyanosis  § Lips  § : lip appearance normal  § Teeth  § : normal dentition for age  § Gums  § : gums pink, non-swollen, no bleeding present  § Tongue  § : tongue appearance normal; normal mobility  § Palate  § : hard palate normal, soft palate appearance normal with symmetric mobility  o Throat  o :   § Oropharynx  § : no inflammation or lesions present, tonsils within normal limits  · Neck  o Inspection/Palpation  o : normal appearance, no masses or tenderness, trachea midline; thyroid size normal, nontender, no nodules or masses present on palpation  · Respiratory  o Respiratory Effort  o : breathing unlabored  o Inspection of Chest  o : normal appearance, no retractions  · Lymphatic  o Neck  o : no lymphadenopathy present  o Supraclavicular Nodes  o : no lymphadenopathy present  o Preauricular Nodes  o : no lymphadenopathy present  · Skin and Subcutaneous Tissue  o General  Inspection  o : Regarding face and neck - there are no rashes present, no lesions present, and no areas of discoloration  · Neurologic  o Cranial Nerves  o : cranial nerves II-XII are grossly intact bilaterally  o Gait and Station  o : normal gait, able to stand without diffculty  · Psychiatric  o Judgement and Insight  o : judgment and insight intact  o Mood and Affect  o : mood normal, affect appropriate          Assessment  · Mixed conductive and sensorineural hearing loss of left ear     389.21/H90.72  · Sensorineural hearing loss of right ear     389.15/H90.41      Plan  · Medications  o Medications have been Reconciled  o Transition of Care or Provider Policy  · Instructions  o He presents the clinic today for 4-week follow-up regarding hearing loss and left ear fullness. When he was last seen on 3/9/2021 he has been experiencing 3 months of left-sided ear fullness and hearing loss. On exam he had a left-sided effusion. Started on a course of antibiotics, Flonase and daily nasal rinses. He states that he did the rinses for about 2 weeks but then started having burning in his nose so he has been doing them less frequently. However, he states that he is feeling much better. He states that his ears are much improved and he is using the Flonase daily. 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 He states that he has had trouble hearing for many years and his wife often complains that he cannot hear her voice. He returns today for an audiogram. On examination today bilateral tympanic membranes are normal in appearance. There are no perforations and middle ears are well aerated. The left-sided effusion has since resolved. We did obtain an audiogram and tympanogram. The audiogram shows the right ear with a mild to moderately severe sensorineural hearing loss from 3000 through 8000 Hz. The left ear has normal hearing through 2000 Hz downward sloping to a  severe to profound mixed loss with both conductive and sensorineural components. Speech reception threshold was at 15 dB bilaterally. Word discrimination scores were 100% bilaterally at 65 dB. Left tympanogram was normal and right tympanogram was normal with some excessive compliance. I have gone over the results of the audiogram with the patient and also given him a copy. Given his speech audiometry scores he is a good candidate for hearing aid should he feel like he needs that. He states he feels like his hearing has been stable and only noticed a difference when he did have that left-sided ear effusion. He reports that this happens every 1 to 2 years. I encouraged him to continue to use the saline nasal rinses and fluticasone as well as his daily antihistamines that he uses for his allergy symptoms. I encouraged him to use the politzerization techniques to help open up the eustachian tubes. I will plan to see him back should he notice any sudden change in his hearing or any other symptoms.  o Electronically Identified Patient Education Materials Provided Electronically  · Correspondence  o ENT Letter to Referring MD (Eun Carrillo NP) - 04/06/2021            Electronically Signed by: RUI Morin -Author on April 6, 2021 10:06:38 AM

## 2021-05-15 VITALS
WEIGHT: 224 LBS | SYSTOLIC BLOOD PRESSURE: 132 MMHG | BODY MASS INDEX: 33.95 KG/M2 | DIASTOLIC BLOOD PRESSURE: 86 MMHG | HEART RATE: 72 BPM | HEIGHT: 68 IN

## 2021-05-15 VITALS — RESPIRATION RATE: 12 BRPM | BODY MASS INDEX: 33.38 KG/M2 | HEIGHT: 69 IN | WEIGHT: 225.37 LBS

## 2021-05-23 ENCOUNTER — TRANSCRIBE ORDERS (OUTPATIENT)
Dept: CARDIOLOGY | Facility: CLINIC | Age: 56
End: 2021-05-23

## 2021-05-23 DIAGNOSIS — I25.10 MILD CAD: Primary | ICD-10-CM

## 2021-06-06 NOTE — PROGRESS NOTES
"   Progress Note      Patient Name: Anival Arevalo   Patient ID: [id number]   Sex: Male   YOB: 1965    Primary Care Provider: Eun Carrillo NP   Referring Provider: Eun Carrillo NP    Visit Date: May 10, 2021    Provider: Tha Coppola MD   Location: [facility]   Location Address: [address] [place] [address], [facility]   [place]  [id number]   Location Phone: [phone]          Chief Complaint     Coronary artery disease.       History Of Present Illness  REFERRING CARE PROVIDER: Eun Carrillo NP   Anival Arevalo is a 56 year old /White male with CAD, congestive heart failure, systolic, and hypertension who has been doing well. He has mild shortness of breath symptoms with exertion, but no significant worsening, and chronic cough issues. No chest pain.   PAST MEDICAL HISTORY: Coronary artery disease with an MI in 2013 with PCI and stenting in 2013 to the RCA in 2014; Dyslipidemia; Hypertension; Systolic congestive heart failure, most recent ejection fraction of 35% on the nuclear stress test in July 2019.   PSYCHOSOCIAL HISTORY: Previous tobacco use, but quit. Rarely consumes alcohol.   CURRENT MEDICATIONS: Medications reviewed and are as documented.      ALLERGIES:  Zetia; Zithromax Z-Conrado.       Review of Systems  · Cardiovascular  o Admits  o : shortness of breath while walking or lying flat  o Denies  o : palpitations (fast, fluttering, or skipping beats), swelling (feet, ankles, hands), chest pain or angina pectoris   · Respiratory  o Admits  o : chronic or frequent cough      Vitals  Date Time BP Position Site L\R Cuff Size HR RR TEMP (F) WT  HT  BMI kg/m2 BSA m2 O2 Sat FR L/min FiO2 HC       05/10/2021 09:50 /76 Sitting    72 - R   212lbs 0oz 5'  8\" 32.23 2.15             Physical Examination  · Constitutional  o Appearance  o : Awake, alert, in no acute distress.   · Eyes  o Conjunctivae  o : Normal.  · Ears, Nose, Mouth and Throat  o Oral Cavity  o :   § Oral Mucosa  § : " Normal.  · Neck  o Inspection/Palpation  o : No JVD. Good carotid upstroke. No thyromegaly.  · Respiratory  o Respiratory  o : Good respiratory effort. Clear to percussion and auscultation.  · Cardiovascular  o Heart  o :   § Auscultation of Heart  § : S1, S2 normal. Regular rate and rhythm without murmurs, gallops, or rubs.  o Peripheral Vascular System  o :   § Extremities  § : Good femoral and pedal pulses. No pedal edema.  · Gastrointestinal  o Abdominal Examination  o : Soft. No tenderness or masses felt. No hepatosplenomegaly. Abdominal aorta is not palpable.  · EKG  o EKG  o : Performed in the office today.  o Indications  o : Coronary artery disease.  o Results  o : Normal sinus rhythm with a PVC, nonspecific intraventricular conduction delay, and evidence of old inferior wall MI.  o Comparison  o : No significant change from prior EKG.           Assessment     ASSESSMENT & PLAN:    1.  Coronary artery disease with prior stent.  No ongoing anginal chest discomfort.  On chronic aspirin 81 mg        once a day.  2.  Congestive heart failure, systolic, class II symptoms.  Persistent dyspnea on exertion symptoms.  Abnormal        EKG.  We will check an echocardiogram to reassess LV systolic function.  Previous EF at 36% on SPECT        imaging.  3.  Hypertension.  Blood pressure within limits at less than 140/90 on lisinopril 5 mg once a day and       Coreg 3.25 mg b.i.d.  Continue with home monitoring.             Electronically Signed by: Jackie Costa-, Other -Author on May 10, 2021 05:18:19 PM  Electronically Co-signed by: Tha Coppola MD -Reviewer on May 12, 2021 03:39:21 PM

## 2021-07-15 VITALS
WEIGHT: 212 LBS | HEIGHT: 68 IN | DIASTOLIC BLOOD PRESSURE: 76 MMHG | HEART RATE: 72 BPM | BODY MASS INDEX: 32.13 KG/M2 | SYSTOLIC BLOOD PRESSURE: 132 MMHG

## 2022-01-03 PROBLEM — E78.5 HYPERLIPIDEMIA LDL GOAL <70: Status: ACTIVE | Noted: 2022-01-03

## 2022-01-03 PROBLEM — I50.22 CHRONIC HFREF (HEART FAILURE WITH REDUCED EJECTION FRACTION) (HCC): Status: ACTIVE | Noted: 2022-01-03

## 2022-01-03 PROBLEM — I25.10 CAD S/P PERCUTANEOUS CORONARY ANGIOPLASTY: Status: ACTIVE | Noted: 2022-01-03

## 2022-01-03 PROBLEM — I10 ESSENTIAL HYPERTENSION: Status: ACTIVE | Noted: 2022-01-03

## 2022-01-03 PROBLEM — Z98.61 CAD S/P PERCUTANEOUS CORONARY ANGIOPLASTY: Status: ACTIVE | Noted: 2022-01-03

## 2022-01-03 NOTE — PROGRESS NOTES
Chief Complaint  Coronary Artery Disease, Congestive Heart Failure, and Hypertension    Subjective    Patient is doing well symptomatically denies any anginal chest pain or shortness of breath or myalgias    Past Medical History:   Diagnosis Date   • Abnormal ECG    • Atrial fibrillation (HCC)    • Cancer of kidney (HCC)     L with partial removal 06/22/2018   • CHF (congestive heart failure) (HCC)    • COPD (chronic obstructive pulmonary disease) (HCC)    • Coronary artery disease    • Hyperlipidemia    • Hypertension    • Myocardial infarction (HCC)     Coronary artery disease with an MI in 2013 with PCI and stenting in 2013 to the RCA in 2014; Dyslipidemia; Hypertension; Systolic congestive heart failure, most recent ejection fraction of 35% on the nuclear stress test in July 2019.       Current Outpatient Medications:   •  albuterol (PROVENTIL) (5 MG/ML) 0.5% nebulizer solution, Inhale 2.5 mg., Disp: , Rfl:   •  aspirin 81 MG EC tablet, Take 81 mg by mouth., Disp: , Rfl:   •  carvedilol (COREG) 3.125 MG tablet, Take 3.125 mg by mouth 2 (Two) Times a Day With Meals., Disp: , Rfl:   •  cetirizine (ZyrTEC Allergy) 10 MG tablet, Daily., Disp: , Rfl:   •  Cholecalciferol 125 MCG (5000 UT) tablet, Daily., Disp: , Rfl:   •  fluticasone (FLONASE) 50 MCG/ACT nasal spray, 2 sprays into each nostril., Disp: , Rfl:   •  indapamide (LOZOL) 1.25 MG tablet, Take 1.25 mg by mouth Daily., Disp: , Rfl:   •  lisinopril (PRINIVIL,ZESTRIL) 5 MG tablet, Take 5 mg by mouth Daily., Disp: , Rfl:   •  montelukast (SINGULAIR) 10 MG tablet, Take 10 mg by mouth Every Night., Disp: , Rfl:   •  nitroglycerin (NITROSTAT) 0.4 MG SL tablet, Place 0.4 mg under the tongue Every 5 (Five) Minutes As Needed., Disp: , Rfl:   •  rosuvastatin (CRESTOR) 40 MG tablet, Take 40 mg by mouth Daily., Disp: , Rfl:     Medications Discontinued During This Encounter   Medication Reason   • triamcinolone (KENALOG) 0.1 % cream *Therapy completed     Allergies  "  Allergen Reactions   • Azithromycin Other (See Comments)     Leaves a really bad taste in his mouth - \"rotting flesh\"   • Ezetimibe Rash        Social History     Tobacco Use   • Smoking status: Former Smoker     Packs/day: 3.00     Types: Cigarettes     Start date:      Quit date: 2013     Years since quittin.0   • Smokeless tobacco: Never Used   Vaping Use   • Vaping Use: Never used   Substance Use Topics   • Alcohol use: No   • Drug use: Never       Family History   Problem Relation Age of Onset   • Cancer Mother    • Cancer Father         Objective     /84   Pulse 70   Ht 172.7 cm (68\")   Wt 98.9 kg (218 lb)   BMI 33.15 kg/m²       Physical Exam    General Appearance:   · no acute distress  · Alert and oriented x3  HENT:   · lips not cyanotic  · Atraumatic  Neck:  · No jvd   · supple  Respiratory:  · no respiratory distress  · normal breath sounds  · no rales  Cardiovascular:  · Regular rate and rhythm  · no S3, no S4   · no murmur  · no rub  Extremities  · No cyanosis  · lower extremity edema: none    Skin:   · warm, dry  · No rashes      Result Review :     No results found for: PROBNP           ECG 12 Lead    Date/Time: 2022 4:27 PM  Performed by: Tha Coppola MD  Authorized by: Tha Coppola MD   Comparison: compared with previous ECG   Similar to previous ECG  Rhythm: sinus rhythm  Comments: Inferior MI age-indeterminate                   Diagnoses and all orders for this visit:    1. CAD S/P percutaneous coronary angioplasty (Primary)  Assessment & Plan:  Patient is doing well no ongoing angina continue with chronic aspirin 81 mg daily        2. Essential hypertension  Assessment & Plan:  Blood pressure control within range on lisinopril 5 mg once a day and Coreg 3.125 mg twice daily  Counseled patient on  • low-sodium diet of less than 2 g  • Aerobic activity 30 minutes a day 5 times a week  • Weight loss          3. Hyperlipidemia LDL goal <70  Assessment & Plan:  Lipids are at " goal on Crestor 40 mg nightly no myalgias symptoms      4. Chronic HFrEF (heart failure with reduced ejection fraction) ([facility])  Assessment & Plan:  Patient with stable symptoms no weight gain            Follow Up     Return in about 6 months (around 7/5/2022).          Patient was given instructions and counseling regarding his condition or for health maintenance advice. Please see specific information pulled into the AVS if appropriate.

## 2022-01-05 ENCOUNTER — OFFICE VISIT (OUTPATIENT)
Dept: CARDIOLOGY | Facility: CLINIC | Age: 57
End: 2022-01-05

## 2022-01-05 VITALS
BODY MASS INDEX: 33.04 KG/M2 | DIASTOLIC BLOOD PRESSURE: 84 MMHG | WEIGHT: 218 LBS | HEIGHT: 68 IN | HEART RATE: 70 BPM | SYSTOLIC BLOOD PRESSURE: 127 MMHG

## 2022-01-05 DIAGNOSIS — E78.5 HYPERLIPIDEMIA LDL GOAL <70: ICD-10-CM

## 2022-01-05 DIAGNOSIS — I25.10 CAD S/P PERCUTANEOUS CORONARY ANGIOPLASTY: Primary | ICD-10-CM

## 2022-01-05 DIAGNOSIS — Z98.61 CAD S/P PERCUTANEOUS CORONARY ANGIOPLASTY: Primary | ICD-10-CM

## 2022-01-05 DIAGNOSIS — I10 ESSENTIAL HYPERTENSION: ICD-10-CM

## 2022-01-05 DIAGNOSIS — I50.22 CHRONIC HFREF (HEART FAILURE WITH REDUCED EJECTION FRACTION): ICD-10-CM

## 2022-01-05 PROCEDURE — 99214 OFFICE O/P EST MOD 30 MIN: CPT | Performed by: INTERNAL MEDICINE

## 2022-01-05 PROCEDURE — 93000 ELECTROCARDIOGRAM COMPLETE: CPT | Performed by: INTERNAL MEDICINE

## 2022-01-05 RX ORDER — CETIRIZINE HYDROCHLORIDE 10 MG/1
TABLET ORAL DAILY
COMMUNITY

## 2022-01-05 NOTE — ASSESSMENT & PLAN NOTE
Blood pressure control within range on lisinopril 5 mg once a day and Coreg 3.125 mg twice daily  Counseled patient on  • low-sodium diet of less than 2 g  • Aerobic activity 30 minutes a day 5 times a week  • Weight loss

## 2022-01-24 ENCOUNTER — TRANSCRIBE ORDERS (OUTPATIENT)
Dept: ADMINISTRATIVE | Facility: HOSPITAL | Age: 57
End: 2022-01-24

## 2022-01-24 ENCOUNTER — HOSPITAL ENCOUNTER (OUTPATIENT)
Dept: GENERAL RADIOLOGY | Facility: HOSPITAL | Age: 57
Discharge: HOME | End: 2022-01-24
Admitting: NURSE PRACTITIONER

## 2022-01-24 DIAGNOSIS — R05.9 COUGH: ICD-10-CM

## 2022-01-24 DIAGNOSIS — R05.9 COUGH: Primary | ICD-10-CM

## 2022-01-24 PROCEDURE — 71046 X-RAY EXAM CHEST 2 VIEWS: CPT

## 2022-07-14 ENCOUNTER — OFFICE VISIT (OUTPATIENT)
Dept: CARDIOLOGY | Facility: CLINIC | Age: 57
End: 2022-07-14

## 2022-07-14 VITALS
DIASTOLIC BLOOD PRESSURE: 63 MMHG | HEART RATE: 93 BPM | SYSTOLIC BLOOD PRESSURE: 106 MMHG | BODY MASS INDEX: 34.01 KG/M2 | HEIGHT: 68 IN | WEIGHT: 224.4 LBS

## 2022-07-14 DIAGNOSIS — E78.5 HYPERLIPIDEMIA LDL GOAL <70: ICD-10-CM

## 2022-07-14 DIAGNOSIS — I25.10 CAD S/P PERCUTANEOUS CORONARY ANGIOPLASTY: Primary | ICD-10-CM

## 2022-07-14 DIAGNOSIS — Z98.61 CAD S/P PERCUTANEOUS CORONARY ANGIOPLASTY: Primary | ICD-10-CM

## 2022-07-14 DIAGNOSIS — I10 ESSENTIAL HYPERTENSION: ICD-10-CM

## 2022-07-14 DIAGNOSIS — I50.22 CHRONIC HFREF (HEART FAILURE WITH REDUCED EJECTION FRACTION): ICD-10-CM

## 2022-07-14 DIAGNOSIS — R06.09 DOE (DYSPNEA ON EXERTION): ICD-10-CM

## 2022-07-14 PROCEDURE — 99214 OFFICE O/P EST MOD 30 MIN: CPT | Performed by: INTERNAL MEDICINE

## 2022-07-14 RX ORDER — HYDROCHLOROTHIAZIDE 12.5 MG/1
12.5 CAPSULE, GELATIN COATED ORAL DAILY
COMMUNITY

## 2022-07-14 NOTE — PROGRESS NOTES
"Chief Complaint  Hypertension, Hyperlipidemia, and Shortness of Breath    Subjective    Patient has noticed some increased dyspnea on exertion with talking recently denies any PND orthopnea.  He has not increased lower extremity edema has Lasix weight gain.  No anginal chest pain    Past Medical History:   Diagnosis Date   • Abnormal ECG    • Atrial fibrillation (HCC)    • Cancer of kidney (HCC)     L with partial removal 06/22/2018   • CHF (congestive heart failure) (HCC)    • COPD (chronic obstructive pulmonary disease) (HCC)    • Coronary artery disease    • Hyperlipidemia    • Hypertension    • Myocardial infarction (HCC)          Current Outpatient Medications:   •  albuterol (PROVENTIL) (5 MG/ML) 0.5% nebulizer solution, Inhale 2.5 mg., Disp: , Rfl:   •  aspirin 81 MG EC tablet, Take 81 mg by mouth., Disp: , Rfl:   •  carvedilol (COREG) 3.125 MG tablet, Take 3.125 mg by mouth 2 (Two) Times a Day With Meals., Disp: , Rfl:   •  cetirizine (zyrTEC) 10 MG tablet, Daily., Disp: , Rfl:   •  Cholecalciferol 125 MCG (5000 UT) tablet, Daily., Disp: , Rfl:   •  fluticasone (FLONASE) 50 MCG/ACT nasal spray, 2 sprays into each nostril., Disp: , Rfl:   •  hydroCHLOROthiazide (MICROZIDE) 12.5 MG capsule, Take 12.5 mg by mouth Daily., Disp: , Rfl:   •  indapamide (LOZOL) 1.25 MG tablet, Take 1.25 mg by mouth Daily., Disp: , Rfl:   •  lisinopril (PRINIVIL,ZESTRIL) 5 MG tablet, Take 5 mg by mouth Daily., Disp: , Rfl:   •  montelukast (SINGULAIR) 10 MG tablet, Take 10 mg by mouth Every Night., Disp: , Rfl:   •  nitroglycerin (NITROSTAT) 0.4 MG SL tablet, Place 0.4 mg under the tongue Every 5 (Five) Minutes As Needed., Disp: , Rfl:   •  rosuvastatin (CRESTOR) 40 MG tablet, Take 40 mg by mouth Daily., Disp: , Rfl:     There are no discontinued medications.  Allergies   Allergen Reactions   • Azithromycin Other (See Comments)     Leaves a really bad taste in his mouth - \"rotting flesh\"   • Ezetimibe Rash        Social History " "    Tobacco Use   • Smoking status: Former Smoker     Packs/day: 3.00     Types: Cigarettes     Start date:      Quit date:      Years since quittin.5   • Smokeless tobacco: Never Used   Vaping Use   • Vaping Use: Never used   Substance Use Topics   • Alcohol use: No   • Drug use: Never       Family History   Problem Relation Age of Onset   • Cancer Mother    • Cancer Father         Objective     /63   Pulse 93   Ht 172.7 cm (68\")   Wt 102 kg (224 lb 6.4 oz)   BMI 34.12 kg/m²       Physical Exam    General Appearance:   · no acute distress  · Alert and oriented x3  HENT:   · lips not cyanotic  · Atraumatic  Neck:  · No jvd   · supple  Respiratory:  · no respiratory distress  · normal breath sounds  · no rales  Cardiovascular:  · Regular rate and rhythm  · no S3, no S4   · no murmur  · no rub  Extremities  · No cyanosis  · lower extremity edema: Trace  Skin:   · warm, dry  · No rashes      Result Review :     No results found for: PROBNP                     No results found for: POCTROP    Results for orders placed in visit on 22    Adult Transthoracic Echo Complete w/ Color, Spectral and Contrast if necessary per protocol    Interpretation Summary  · Estimated left ventricular EF = 55%  · Left ventricular diastolic function was normal.                 Diagnoses and all orders for this visit:    1. CAD S/P percutaneous coronary angioplasty (Primary)  Assessment & Plan:  Patient is doing well no ongoing angina continue with chronic aspirin 81 mg daily        2. Chronic HFrEF (heart failure with reduced ejection fraction) (HCC)  Assessment & Plan:  Patient with stable volume status weight is down from last visit continue with his hydrochlorothiazide 12.5 mg dosing checking proBNP level      3. Essential hypertension    4. Hyperlipidemia LDL goal <70    5. FLORES (dyspnea on exertion)  Assessment & Plan:  Patient with new shortness of breath symptoms occurring sometimes with just talking at " times may be pulmonary nature recommended a nuclear stress test and proBNP level for CHF and CAD his most recent echocardiogram did not show any significant abnormality    Orders:  -     Stress Test With Myocardial Perfusion One Day; Future  -     proBNP; Future          Follow Up     Return in about 6 months (around 1/14/2023) for EKG with F/U.          Patient was given instructions and counseling regarding his condition or for health maintenance advice. Please see specific information pulled into the AVS if appropriate.

## 2022-07-14 NOTE — ASSESSMENT & PLAN NOTE
Patient with new shortness of breath symptoms occurring sometimes with just talking at times may be pulmonary nature recommended a nuclear stress test and proBNP level for CHF and CAD his most recent echocardiogram did not show any significant abnormality

## 2022-07-14 NOTE — ASSESSMENT & PLAN NOTE
Patient with stable volume status weight is down from last visit continue with his hydrochlorothiazide 12.5 mg dosing checking proBNP level

## 2022-08-05 ENCOUNTER — TELEPHONE (OUTPATIENT)
Dept: CARDIOLOGY | Facility: CLINIC | Age: 57
End: 2022-08-05

## 2022-08-15 ENCOUNTER — TELEPHONE (OUTPATIENT)
Dept: CARDIOLOGY | Facility: CLINIC | Age: 57
End: 2022-08-15

## 2022-08-15 DIAGNOSIS — R06.09 DOE (DYSPNEA ON EXERTION): ICD-10-CM

## 2022-08-15 NOTE — TELEPHONE ENCOUNTER
----- Message from RUI Suarez sent at 8/15/2022  2:47 PM EDT -----  Notify pt BNP and BMP look good, continue current meds

## 2022-08-15 NOTE — TELEPHONE ENCOUNTER
I spoke to patient and gave results. No questions or concerns. He asked to reschedule his stress test so I transferred to scheduling.

## 2022-08-19 ENCOUNTER — HOSPITAL ENCOUNTER (OUTPATIENT)
Dept: NUCLEAR MEDICINE | Facility: HOSPITAL | Age: 57
Discharge: HOME | End: 2022-08-19

## 2022-08-19 DIAGNOSIS — R06.09 DOE (DYSPNEA ON EXERTION): ICD-10-CM

## 2022-08-19 LAB
BH CV IMMEDIATE POST RECOVERY TECH DATA SYMPTOMS: NORMAL
BH CV IMMEDIATE POST TECH DATA BLOOD PRESSURE: NORMAL MMHG
BH CV IMMEDIATE POST TECH DATA HEART RATE: 130 BPM
BH CV IMMEDIATE POST TECH DATA OXYGEN SATS: 94 %
BH CV REST NUCLEAR ISOTOPE DOSE: 9.8 MCI
BH CV SIX MINUTE RECOVERY TECH DATA BLOOD PRESSURE: NORMAL
BH CV SIX MINUTE RECOVERY TECH DATA HEART RATE: 100 BPM
BH CV SIX MINUTE RECOVERY TECH DATA OXYGEN SATURATION: 97 %
BH CV SIX MINUTE RECOVERY TECH DATA SYMPTOMS: NORMAL
BH CV STRESS BP STAGE 1: NORMAL
BH CV STRESS BP STAGE 2: NORMAL
BH CV STRESS DURATION MIN STAGE 1: 3
BH CV STRESS DURATION MIN STAGE 2: 3
BH CV STRESS DURATION MIN STAGE 3: 3
BH CV STRESS DURATION SEC STAGE 1: 0
BH CV STRESS DURATION SEC STAGE 2: 0
BH CV STRESS DURATION SEC STAGE 3: 0
BH CV STRESS GRADE STAGE 1: 10
BH CV STRESS GRADE STAGE 2: 12
BH CV STRESS GRADE STAGE 3: 14
BH CV STRESS HR STAGE 1: 107
BH CV STRESS HR STAGE 2: 126
BH CV STRESS HR STAGE 3: 141
BH CV STRESS METS STAGE 1: 5
BH CV STRESS METS STAGE 2: 7.5
BH CV STRESS METS STAGE 3: 10
BH CV STRESS NUCLEAR ISOTOPE DOSE: 38 MCI
BH CV STRESS O2 STAGE 1: 95
BH CV STRESS O2 STAGE 2: 94
BH CV STRESS PROTOCOL 1: NORMAL
BH CV STRESS RECOVERY BP: NORMAL MMHG
BH CV STRESS RECOVERY HR: 100 BPM
BH CV STRESS RECOVERY O2: 97 %
BH CV STRESS SPEED STAGE 1: 1.7
BH CV STRESS SPEED STAGE 2: 2.5
BH CV STRESS SPEED STAGE 3: 3.4
BH CV STRESS STAGE 1: 1
BH CV STRESS STAGE 2: 2
BH CV STRESS STAGE 3: 3
BH CV THREE MINUTE POST TECH DATA BLOOD PRESSURE: NORMAL MMHG
BH CV THREE MINUTE POST TECH DATA HEART RATE: 106 BPM
BH CV THREE MINUTE POST TECH DATA OXYGEN SATURATION: 97 %
BH CV THREE MINUTE RECOVERY TECH DATA SYMPTOM: NORMAL
LV EF NUC BP: 43 %
MAXIMAL PREDICTED HEART RATE: 161 BPM
PERCENT MAX PREDICTED HR: 87.58 %
STRESS BASELINE BP: NORMAL MMHG
STRESS BASELINE HR: 75 BPM
STRESS O2 SAT REST: 95 %
STRESS PERCENT HR: 103 %
STRESS POST ESTIMATED WORKLOAD: 8.2 METS
STRESS POST EXERCISE DUR MIN: 7 MIN
STRESS POST EXERCISE DUR SEC: 31 SEC
STRESS POST O2 SAT PEAK: 94 %
STRESS POST PEAK BP: NORMAL MMHG
STRESS POST PEAK HR: 141 BPM
STRESS TARGET HR: 137 BPM

## 2022-08-19 PROCEDURE — 93018 CV STRESS TEST I&R ONLY: CPT | Performed by: INTERNAL MEDICINE

## 2022-08-19 PROCEDURE — 0 TECHNETIUM TETROFOSMIN KIT: Performed by: INTERNAL MEDICINE

## 2022-08-19 PROCEDURE — A9502 TC99M TETROFOSMIN: HCPCS | Performed by: INTERNAL MEDICINE

## 2022-08-19 PROCEDURE — 93017 CV STRESS TEST TRACING ONLY: CPT

## 2022-08-19 PROCEDURE — 78452 HT MUSCLE IMAGE SPECT MULT: CPT | Performed by: INTERNAL MEDICINE

## 2022-08-19 PROCEDURE — 78452 HT MUSCLE IMAGE SPECT MULT: CPT

## 2022-08-19 RX ADMIN — TETROFOSMIN 1 DOSE: 1.38 INJECTION, POWDER, LYOPHILIZED, FOR SOLUTION INTRAVENOUS at 11:51

## 2022-08-19 RX ADMIN — TETROFOSMIN 1 DOSE: 1.38 INJECTION, POWDER, LYOPHILIZED, FOR SOLUTION INTRAVENOUS at 10:17

## 2022-08-23 ENCOUNTER — TELEPHONE (OUTPATIENT)
Dept: CARDIOLOGY | Facility: CLINIC | Age: 57
End: 2022-08-23

## 2022-08-23 NOTE — TELEPHONE ENCOUNTER
----- Message from RUI Suarez sent at 8/23/2022 10:15 AM EDT -----  Notify pt stress test result: Moderate sized moderate intensity fixed inferior wall defect consistent with prior myocardial infarction but no significant ischemia noted. He should notify office if shortness of breath persists or worsens so further testing can be done if needed. Follow up as scheduled.

## 2023-01-01 ENCOUNTER — HOSPITAL ENCOUNTER (INPATIENT)
Facility: HOSPITAL | Age: 58
LOS: 2 days | DRG: 283 | End: 2023-09-21
Attending: EMERGENCY MEDICINE | Admitting: FAMILY MEDICINE
Payer: MEDICARE

## 2023-01-01 ENCOUNTER — APPOINTMENT (OUTPATIENT)
Dept: CARDIOLOGY | Facility: HOSPITAL | Age: 58
DRG: 283 | End: 2023-01-01
Payer: MEDICARE

## 2023-01-01 ENCOUNTER — TRANSCRIBE ORDERS (OUTPATIENT)
Dept: CARDIOLOGY | Facility: HOSPITAL | Age: 58
End: 2023-01-01
Payer: MEDICARE

## 2023-01-01 ENCOUNTER — APPOINTMENT (OUTPATIENT)
Dept: GENERAL RADIOLOGY | Facility: HOSPITAL | Age: 58
DRG: 283 | End: 2023-01-01
Payer: MEDICARE

## 2023-01-01 ENCOUNTER — APPOINTMENT (OUTPATIENT)
Dept: MRI IMAGING | Facility: HOSPITAL | Age: 58
DRG: 283 | End: 2023-01-01
Payer: MEDICARE

## 2023-01-01 ENCOUNTER — APPOINTMENT (OUTPATIENT)
Dept: CT IMAGING | Facility: HOSPITAL | Age: 58
DRG: 283 | End: 2023-01-01
Payer: MEDICARE

## 2023-01-01 ENCOUNTER — APPOINTMENT (OUTPATIENT)
Dept: NEUROLOGY | Facility: HOSPITAL | Age: 58
DRG: 283 | End: 2023-01-01
Payer: MEDICARE

## 2023-01-01 VITALS
DIASTOLIC BLOOD PRESSURE: 63 MMHG | TEMPERATURE: 97.8 F | RESPIRATION RATE: 29 BRPM | OXYGEN SATURATION: 47 % | BODY MASS INDEX: 32.36 KG/M2 | WEIGHT: 218.48 LBS | SYSTOLIC BLOOD PRESSURE: 99 MMHG | HEIGHT: 69 IN | HEART RATE: 50 BPM

## 2023-01-01 DIAGNOSIS — R07.9 CHEST PAIN, UNSPECIFIED TYPE: Primary | ICD-10-CM

## 2023-01-01 DIAGNOSIS — I46.9 CARDIAC ARREST: Primary | ICD-10-CM

## 2023-01-01 LAB
ALBUMIN SERPL-MCNC: 2.9 G/DL (ref 3.5–5.2)
ALBUMIN SERPL-MCNC: 2.9 G/DL (ref 3.5–5.2)
ALBUMIN SERPL-MCNC: 3.2 G/DL (ref 3.5–5.2)
ALBUMIN SERPL-MCNC: 3.6 G/DL (ref 3.5–5.2)
ALBUMIN SERPL-MCNC: 3.6 G/DL (ref 3.5–5.2)
ALBUMIN SERPL-MCNC: 3.9 G/DL (ref 3.5–5.2)
ALBUMIN SERPL-MCNC: 4.1 G/DL (ref 3.5–5.2)
ALBUMIN/GLOB SERPL: 1.1 G/DL
ALBUMIN/GLOB SERPL: 1.2 G/DL
ALBUMIN/GLOB SERPL: 1.3 G/DL
ALBUMIN/GLOB SERPL: 1.4 G/DL
ALBUMIN/GLOB SERPL: 1.6 G/DL
ALP SERPL-CCNC: 58 U/L (ref 39–117)
ALP SERPL-CCNC: 61 U/L (ref 39–117)
ALP SERPL-CCNC: 64 U/L (ref 39–117)
ALP SERPL-CCNC: 65 U/L (ref 39–117)
ALP SERPL-CCNC: 76 U/L (ref 39–117)
ALP SERPL-CCNC: 82 U/L (ref 39–117)
ALP SERPL-CCNC: 90 U/L (ref 39–117)
ALT SERPL W P-5'-P-CCNC: 118 U/L (ref 1–41)
ALT SERPL W P-5'-P-CCNC: 56 U/L (ref 1–41)
ALT SERPL W P-5'-P-CCNC: 63 U/L (ref 1–41)
ALT SERPL W P-5'-P-CCNC: 81 U/L (ref 1–41)
ALT SERPL W P-5'-P-CCNC: 85 U/L (ref 1–41)
ALT SERPL W P-5'-P-CCNC: 89 U/L (ref 1–41)
ALT SERPL W P-5'-P-CCNC: 90 U/L (ref 1–41)
AMORPH URATE CRY URNS QL MICRO: ABNORMAL /HPF
AMORPH URATE CRY URNS QL MICRO: ABNORMAL /HPF
ANION GAP SERPL CALCULATED.3IONS-SCNC: 12.2 MMOL/L (ref 5–15)
ANION GAP SERPL CALCULATED.3IONS-SCNC: 15.4 MMOL/L (ref 5–15)
ANION GAP SERPL CALCULATED.3IONS-SCNC: 18 MMOL/L (ref 5–15)
ANION GAP SERPL CALCULATED.3IONS-SCNC: 23.2 MMOL/L (ref 5–15)
ANION GAP SERPL CALCULATED.3IONS-SCNC: 23.3 MMOL/L (ref 5–15)
ANION GAP SERPL CALCULATED.3IONS-SCNC: 6.9 MMOL/L (ref 5–15)
ANION GAP SERPL CALCULATED.3IONS-SCNC: 9.9 MMOL/L (ref 5–15)
ANISOCYTOSIS BLD QL: NORMAL
APTT PPP: 104.4 SECONDS (ref 78–95.9)
APTT PPP: 111.4 SECONDS (ref 78–95.9)
APTT PPP: 133.4 SECONDS (ref 78–95.9)
APTT PPP: 32.3 SECONDS (ref 24.2–34.2)
APTT PPP: 61.4 SECONDS (ref 78–95.9)
APTT PPP: 63 SECONDS (ref 78–95.9)
APTT PPP: 71.9 SECONDS (ref 78–95.9)
APTT PPP: 82.2 SECONDS (ref 78–95.9)
ARTERIAL PATENCY WRIST A: ABNORMAL
ARTERIAL PATENCY WRIST A: POSITIVE
ARTERIAL PATENCY WRIST A: POSITIVE
AST SERPL-CCNC: 102 U/L (ref 1–40)
AST SERPL-CCNC: 118 U/L (ref 1–40)
AST SERPL-CCNC: 120 U/L (ref 1–40)
AST SERPL-CCNC: 127 U/L (ref 1–40)
AST SERPL-CCNC: 130 U/L (ref 1–40)
AST SERPL-CCNC: 150 U/L (ref 1–40)
AST SERPL-CCNC: 74 U/L (ref 1–40)
BACTERIA SPEC AEROBE CULT: NO GROWTH
BACTERIA SPEC RESP CULT: NORMAL
BACTERIA UR QL AUTO: ABNORMAL /HPF
BACTERIA UR QL AUTO: ABNORMAL /HPF
BASE EXCESS BLDA CALC-SCNC: -11.3 MMOL/L (ref -2–2)
BASE EXCESS BLDA CALC-SCNC: -14.2 MMOL/L (ref -2–2)
BASE EXCESS BLDA CALC-SCNC: -6.9 MMOL/L (ref -2–2)
BASE EXCESS BLDA CALC-SCNC: -9.5 MMOL/L (ref -2–2)
BASE EXCESS BLDA CALC-SCNC: -9.9 MMOL/L (ref -2–2)
BASOPHILS # BLD AUTO: 0.03 10*3/MM3 (ref 0–0.2)
BASOPHILS # BLD AUTO: 0.03 10*3/MM3 (ref 0–0.2)
BASOPHILS # BLD AUTO: 0.05 10*3/MM3 (ref 0–0.2)
BASOPHILS # BLD AUTO: 0.06 10*3/MM3 (ref 0–0.2)
BASOPHILS # BLD AUTO: 0.06 10*3/MM3 (ref 0–0.2)
BASOPHILS # BLD AUTO: 0.09 10*3/MM3 (ref 0–0.2)
BASOPHILS # BLD MANUAL: 0.22 10*3/MM3 (ref 0–0.2)
BASOPHILS NFR BLD AUTO: 0.2 % (ref 0–1.5)
BASOPHILS NFR BLD AUTO: 0.2 % (ref 0–1.5)
BASOPHILS NFR BLD AUTO: 0.3 % (ref 0–1.5)
BASOPHILS NFR BLD AUTO: 0.3 % (ref 0–1.5)
BASOPHILS NFR BLD AUTO: 0.8 % (ref 0–1.5)
BASOPHILS NFR BLD AUTO: 0.9 % (ref 0–1.5)
BASOPHILS NFR BLD MANUAL: 1 % (ref 0–1.5)
BDY SITE: ABNORMAL
BH CV ECHO MEAS - AO MEAN PG: 1.81 MMHG
BH CV ECHO MEAS - AO V2 VTI: 11.6 CM
BH CV ECHO MEAS - AVA(I,D): 2.6 CM2
BH CV ECHO MEAS - EDV(CUBED): 101.9 ML
BH CV ECHO MEAS - EDV(MOD-SP2): 103 ML
BH CV ECHO MEAS - EDV(MOD-SP4): 147 ML
BH CV ECHO MEAS - EF(MOD-BP): 39.3 %
BH CV ECHO MEAS - EF(MOD-SP2): 42.1 %
BH CV ECHO MEAS - EF(MOD-SP4): 36.9 %
BH CV ECHO MEAS - ESV(CUBED): 57.4 ML
BH CV ECHO MEAS - ESV(MOD-SP2): 59.6 ML
BH CV ECHO MEAS - ESV(MOD-SP4): 92.8 ML
BH CV ECHO MEAS - FS: 17.4 %
BH CV ECHO MEAS - IVS/LVPW: 0.89 CM
BH CV ECHO MEAS - IVSD: 0.97 CM
BH CV ECHO MEAS - LA DIMENSION: 3.8 CM
BH CV ECHO MEAS - LAT PEAK E' VEL: 7.1 CM/SEC
BH CV ECHO MEAS - LV MASS(C)D: 169.3 GRAMS
BH CV ECHO MEAS - LV MAX PG: 1.9 MMHG
BH CV ECHO MEAS - LV MEAN PG: 1.24 MMHG
BH CV ECHO MEAS - LV V1 MAX: 69 CM/SEC
BH CV ECHO MEAS - LV V1 VTI: 9.5 CM
BH CV ECHO MEAS - LVIDD: 4.7 CM
BH CV ECHO MEAS - LVIDS: 3.9 CM
BH CV ECHO MEAS - LVOT AREA: 3.1 CM2
BH CV ECHO MEAS - LVOT DIAM: 2 CM
BH CV ECHO MEAS - LVPWD: 1.09 CM
BH CV ECHO MEAS - MED PEAK E' VEL: 4.4 CM/SEC
BH CV ECHO MEAS - MV A MAX VEL: 60.4 CM/SEC
BH CV ECHO MEAS - MV DEC TIME: 0.14 SEC
BH CV ECHO MEAS - MV E MAX VEL: 33 CM/SEC
BH CV ECHO MEAS - MV E/A: 0.55
BH CV ECHO MEAS - SV(LVOT): 29.9 ML
BH CV ECHO MEAS - SV(MOD-SP2): 43.4 ML
BH CV ECHO MEAS - SV(MOD-SP4): 54.2 ML
BH CV ECHO MEAS - TAPSE (>1.6): 1.46 CM
BH CV ECHO MEASUREMENTS AVERAGE E/E' RATIO: 5.74
BILIRUB SERPL-MCNC: 0.3 MG/DL (ref 0–1.2)
BILIRUB SERPL-MCNC: 0.6 MG/DL (ref 0–1.2)
BILIRUB SERPL-MCNC: 0.7 MG/DL (ref 0–1.2)
BILIRUB SERPL-MCNC: 0.7 MG/DL (ref 0–1.2)
BILIRUB SERPL-MCNC: 0.9 MG/DL (ref 0–1.2)
BILIRUB SERPL-MCNC: 1 MG/DL (ref 0–1.2)
BILIRUB SERPL-MCNC: 1 MG/DL (ref 0–1.2)
BILIRUB UR QL STRIP: ABNORMAL
BILIRUB UR QL STRIP: NEGATIVE
BUN SERPL-MCNC: 26 MG/DL (ref 8–23)
BUN SERPL-MCNC: 29 MG/DL (ref 8–23)
BUN SERPL-MCNC: 38 MG/DL (ref 8–23)
BUN SERPL-MCNC: 43 MG/DL (ref 8–23)
BUN SERPL-MCNC: 45 MG/DL (ref 8–23)
BUN SERPL-MCNC: 47 MG/DL (ref 8–23)
BUN SERPL-MCNC: 47 MG/DL (ref 8–23)
BUN/CREAT SERPL: 13.7 (ref 7–25)
BUN/CREAT SERPL: 14.4 (ref 7–25)
BUN/CREAT SERPL: 16 (ref 7–25)
BUN/CREAT SERPL: 20.1 (ref 7–25)
BUN/CREAT SERPL: 26 (ref 7–25)
BUN/CREAT SERPL: 29.2 (ref 7–25)
BUN/CREAT SERPL: 35.9 (ref 7–25)
BURR CELLS BLD QL SMEAR: NORMAL
CA-I BLDA-SCNC: 1.02 MMOL/L (ref 1.13–1.32)
CA-I BLDA-SCNC: 1.02 MMOL/L (ref 1.13–1.32)
CA-I BLDA-SCNC: 1.05 MMOL/L (ref 1.13–1.32)
CA-I BLDA-SCNC: 1.05 MMOL/L (ref 1.13–1.32)
CA-I BLDA-SCNC: 1.07 MMOL/L (ref 1.13–1.32)
CA-I BLDA-SCNC: 1.08 MMOL/L (ref 1.13–1.32)
CA-I BLDA-SCNC: 1.08 MMOL/L (ref 1.13–1.32)
CA-I BLDA-SCNC: 1.11 MMOL/L (ref 1.13–1.32)
CA-I BLDA-SCNC: 1.12 MMOL/L (ref 1.13–1.32)
CA-I BLDA-SCNC: 1.12 MMOL/L (ref 1.13–1.32)
CALCIUM SPEC-SCNC: 7.4 MG/DL (ref 8.6–10.5)
CALCIUM SPEC-SCNC: 7.8 MG/DL (ref 8.6–10.5)
CALCIUM SPEC-SCNC: 8 MG/DL (ref 8.6–10.5)
CALCIUM SPEC-SCNC: 8.1 MG/DL (ref 8.6–10.5)
CALCIUM SPEC-SCNC: 8.3 MG/DL (ref 8.6–10.5)
CALCIUM SPEC-SCNC: 8.3 MG/DL (ref 8.6–10.5)
CALCIUM SPEC-SCNC: 8.9 MG/DL (ref 8.6–10.5)
CHLORIDE BLDA-SCNC: 102 MMOL/L (ref 98–106)
CHLORIDE BLDA-SCNC: 105 MMOL/L (ref 98–106)
CHLORIDE BLDA-SCNC: 106 MMOL/L (ref 98–106)
CHLORIDE BLDA-SCNC: 107 MMOL/L (ref 98–106)
CHLORIDE BLDA-SCNC: 98 MMOL/L (ref 98–106)
CHLORIDE SERPL-SCNC: 101 MMOL/L (ref 98–107)
CHLORIDE SERPL-SCNC: 104 MMOL/L (ref 98–107)
CHLORIDE SERPL-SCNC: 105 MMOL/L (ref 98–107)
CHLORIDE SERPL-SCNC: 106 MMOL/L (ref 98–107)
CHLORIDE SERPL-SCNC: 106 MMOL/L (ref 98–107)
CHLORIDE SERPL-SCNC: 108 MMOL/L (ref 98–107)
CHLORIDE SERPL-SCNC: 98 MMOL/L (ref 98–107)
CHOLEST SERPL-MCNC: 37 MG/DL (ref 0–200)
CK SERPL-CCNC: 187 U/L (ref 20–200)
CLARITY UR: ABNORMAL
CLARITY UR: ABNORMAL
CO2 SERPL-SCNC: 15 MMOL/L (ref 22–29)
CO2 SERPL-SCNC: 17.6 MMOL/L (ref 22–29)
CO2 SERPL-SCNC: 17.7 MMOL/L (ref 22–29)
CO2 SERPL-SCNC: 18.8 MMOL/L (ref 22–29)
CO2 SERPL-SCNC: 19.8 MMOL/L (ref 22–29)
CO2 SERPL-SCNC: 24.1 MMOL/L (ref 22–29)
CO2 SERPL-SCNC: 25.1 MMOL/L (ref 22–29)
COHGB MFR BLD: 0 % (ref 0–1.5)
COHGB MFR BLD: 0.1 % (ref 0–1.5)
COHGB MFR BLD: 0.1 % (ref 0–1.5)
COHGB MFR BLD: 0.2 % (ref 0–1.5)
COHGB MFR BLD: 0.5 % (ref 0–1.5)
COLOR UR: ABNORMAL
COLOR UR: YELLOW
CREAT SERPL-MCNC: 1.31 MG/DL (ref 0.76–1.27)
CREAT SERPL-MCNC: 1.54 MG/DL (ref 0.76–1.27)
CREAT SERPL-MCNC: 1.81 MG/DL (ref 0.76–1.27)
CREAT SERPL-MCNC: 1.9 MG/DL (ref 0.76–1.27)
CREAT SERPL-MCNC: 2.01 MG/DL (ref 0.76–1.27)
CREAT SERPL-MCNC: 2.14 MG/DL (ref 0.76–1.27)
CREAT SERPL-MCNC: 2.37 MG/DL (ref 0.76–1.27)
D-LACTATE SERPL-SCNC: 10.1 MMOL/L (ref 0.5–2)
D-LACTATE SERPL-SCNC: 2 MMOL/L (ref 0.5–2)
D-LACTATE SERPL-SCNC: 2.6 MMOL/L (ref 0.5–2)
D-LACTATE SERPL-SCNC: 3.1 MMOL/L (ref 0.5–2)
D-LACTATE SERPL-SCNC: 4.9 MMOL/L (ref 0.5–2)
D-LACTATE SERPL-SCNC: 6.5 MMOL/L (ref 0.5–2)
DEPRECATED RDW RBC AUTO: 40.9 FL (ref 37–54)
DEPRECATED RDW RBC AUTO: 41 FL (ref 37–54)
DEPRECATED RDW RBC AUTO: 41.9 FL (ref 37–54)
DEPRECATED RDW RBC AUTO: 42.4 FL (ref 37–54)
DEPRECATED RDW RBC AUTO: 43.1 FL (ref 37–54)
DEPRECATED RDW RBC AUTO: 43.7 FL (ref 37–54)
DEPRECATED RDW RBC AUTO: 44.2 FL (ref 37–54)
EGFRCR SERPLBLD CKD-EPI 2021: 30.6 ML/MIN/1.73
EGFRCR SERPLBLD CKD-EPI 2021: 34.6 ML/MIN/1.73
EGFRCR SERPLBLD CKD-EPI 2021: 37.3 ML/MIN/1.73
EGFRCR SERPLBLD CKD-EPI 2021: 39.9 ML/MIN/1.73
EGFRCR SERPLBLD CKD-EPI 2021: 42.3 ML/MIN/1.73
EGFRCR SERPLBLD CKD-EPI 2021: 51.3 ML/MIN/1.73
EGFRCR SERPLBLD CKD-EPI 2021: 62.3 ML/MIN/1.73
ELLIPTOCYTES BLD QL SMEAR: ABNORMAL
EOSINOPHIL # BLD AUTO: 0.01 10*3/MM3 (ref 0–0.4)
EOSINOPHIL # BLD AUTO: 0.02 10*3/MM3 (ref 0–0.4)
EOSINOPHIL # BLD AUTO: 0.02 10*3/MM3 (ref 0–0.4)
EOSINOPHIL # BLD AUTO: 0.03 10*3/MM3 (ref 0–0.4)
EOSINOPHIL # BLD AUTO: 0.03 10*3/MM3 (ref 0–0.4)
EOSINOPHIL # BLD AUTO: 0.5 10*3/MM3 (ref 0–0.4)
EOSINOPHIL # BLD MANUAL: 0.22 10*3/MM3 (ref 0–0.4)
EOSINOPHIL NFR BLD AUTO: 0.1 % (ref 0.3–6.2)
EOSINOPHIL NFR BLD AUTO: 0.3 % (ref 0.3–6.2)
EOSINOPHIL NFR BLD AUTO: 0.4 % (ref 0.3–6.2)
EOSINOPHIL NFR BLD AUTO: 4.2 % (ref 0.3–6.2)
EOSINOPHIL NFR BLD MANUAL: 1 % (ref 0.3–6.2)
ERYTHROCYTE [DISTWIDTH] IN BLOOD BY AUTOMATED COUNT: 12.4 % (ref 12.3–15.4)
ERYTHROCYTE [DISTWIDTH] IN BLOOD BY AUTOMATED COUNT: 12.6 % (ref 12.3–15.4)
ERYTHROCYTE [DISTWIDTH] IN BLOOD BY AUTOMATED COUNT: 12.7 % (ref 12.3–15.4)
ERYTHROCYTE [DISTWIDTH] IN BLOOD BY AUTOMATED COUNT: 12.8 % (ref 12.3–15.4)
ERYTHROCYTE [DISTWIDTH] IN BLOOD BY AUTOMATED COUNT: 13 % (ref 12.3–15.4)
FHHB: 10.8 % (ref 0–5)
FHHB: 22.8 % (ref 0–5)
FHHB: 3.7 % (ref 0–5)
FHHB: 3.8 % (ref 0–5)
FHHB: 6.2 % (ref 0–5)
GAS FLOW AIRWAY: 0 LPM
GAS FLOW AIRWAY: 15 LPM
GAS FLOW AIRWAY: ABNORMAL L/MIN
GAS FLOW AIRWAY: ABNORMAL L/MIN
GEN 5 2HR TROPONIN T REFLEX: 224 NG/L
GLOBULIN UR ELPH-MCNC: 2.3 GM/DL
GLOBULIN UR ELPH-MCNC: 2.4 GM/DL
GLOBULIN UR ELPH-MCNC: 2.5 GM/DL
GLOBULIN UR ELPH-MCNC: 2.6 GM/DL
GLOBULIN UR ELPH-MCNC: 2.6 GM/DL
GLUCOSE BLDA-MCNC: 139 MG/DL (ref 70–99)
GLUCOSE BLDA-MCNC: 150 MG/DL (ref 70–99)
GLUCOSE BLDA-MCNC: 189 MG/DL (ref 70–99)
GLUCOSE BLDA-MCNC: 289 MG/DL (ref 70–99)
GLUCOSE BLDA-MCNC: 317 MG/DL (ref 70–99)
GLUCOSE BLDC GLUCOMTR-MCNC: 120 MG/DL (ref 70–99)
GLUCOSE BLDC GLUCOMTR-MCNC: 123 MG/DL (ref 70–99)
GLUCOSE BLDC GLUCOMTR-MCNC: 126 MG/DL (ref 70–99)
GLUCOSE BLDC GLUCOMTR-MCNC: 127 MG/DL (ref 70–99)
GLUCOSE BLDC GLUCOMTR-MCNC: 133 MG/DL (ref 70–99)
GLUCOSE BLDC GLUCOMTR-MCNC: 146 MG/DL (ref 70–99)
GLUCOSE BLDC GLUCOMTR-MCNC: 152 MG/DL (ref 70–99)
GLUCOSE BLDC GLUCOMTR-MCNC: 155 MG/DL (ref 70–99)
GLUCOSE BLDC GLUCOMTR-MCNC: 160 MG/DL (ref 70–99)
GLUCOSE SERPL-MCNC: 144 MG/DL (ref 65–99)
GLUCOSE SERPL-MCNC: 150 MG/DL (ref 65–99)
GLUCOSE SERPL-MCNC: 153 MG/DL (ref 65–99)
GLUCOSE SERPL-MCNC: 154 MG/DL (ref 65–99)
GLUCOSE SERPL-MCNC: 164 MG/DL (ref 65–99)
GLUCOSE SERPL-MCNC: 293 MG/DL (ref 65–99)
GLUCOSE SERPL-MCNC: 303 MG/DL (ref 65–99)
GLUCOSE UR STRIP-MCNC: ABNORMAL MG/DL
GLUCOSE UR STRIP-MCNC: ABNORMAL MG/DL
GRAM STN SPEC: NORMAL
HBA1C MFR BLD: 5.6 % (ref 4.8–5.6)
HCO3 BLDA-SCNC: 14.1 MMOL/L (ref 22–26)
HCO3 BLDA-SCNC: 16.7 MMOL/L (ref 22–26)
HCO3 BLDA-SCNC: 18.7 MMOL/L (ref 22–26)
HCO3 BLDA-SCNC: 19.2 MMOL/L (ref 22–26)
HCO3 BLDA-SCNC: 23.5 MMOL/L (ref 22–26)
HCT VFR BLD AUTO: 36.8 % (ref 37.5–51)
HCT VFR BLD AUTO: 38.8 % (ref 37.5–51)
HCT VFR BLD AUTO: 42 % (ref 37.5–51)
HCT VFR BLD AUTO: 46 % (ref 37.5–51)
HCT VFR BLD AUTO: 47.1 % (ref 37.5–51)
HCT VFR BLD AUTO: 47.5 % (ref 37.5–51)
HCT VFR BLD AUTO: 50.9 % (ref 37.5–51)
HDLC SERPL-MCNC: 9 MG/DL (ref 40–60)
HGB BLD-MCNC: 11.8 G/DL (ref 13–17.7)
HGB BLD-MCNC: 12.8 G/DL (ref 13–17.7)
HGB BLD-MCNC: 14.5 G/DL (ref 13–17.7)
HGB BLD-MCNC: 14.9 G/DL (ref 13–17.7)
HGB BLD-MCNC: 15.5 G/DL (ref 13–17.7)
HGB BLD-MCNC: 15.7 G/DL (ref 13–17.7)
HGB BLD-MCNC: 16 G/DL (ref 13–17.7)
HGB BLDA-MCNC: 14.4 G/DL (ref 13.8–16.4)
HGB BLDA-MCNC: 15 G/DL (ref 13.8–16.4)
HGB BLDA-MCNC: 16 G/DL (ref 13.8–16.4)
HGB BLDA-MCNC: 16 G/DL (ref 13.8–16.4)
HGB BLDA-MCNC: 16.6 G/DL (ref 13.8–16.4)
HGB UR QL STRIP.AUTO: ABNORMAL
HGB UR QL STRIP.AUTO: ABNORMAL
HOLD SPECIMEN: NORMAL
HYALINE CASTS UR QL AUTO: ABNORMAL /LPF
HYALINE CASTS UR QL AUTO: ABNORMAL /LPF
IMM GRANULOCYTES # BLD AUTO: 0.05 10*3/MM3 (ref 0–0.05)
IMM GRANULOCYTES # BLD AUTO: 0.07 10*3/MM3 (ref 0–0.05)
IMM GRANULOCYTES # BLD AUTO: 0.09 10*3/MM3 (ref 0–0.05)
IMM GRANULOCYTES # BLD AUTO: 0.1 10*3/MM3 (ref 0–0.05)
IMM GRANULOCYTES # BLD AUTO: 0.12 10*3/MM3 (ref 0–0.05)
IMM GRANULOCYTES # BLD AUTO: 0.37 10*3/MM3 (ref 0–0.05)
IMM GRANULOCYTES NFR BLD AUTO: 0.6 % (ref 0–0.5)
IMM GRANULOCYTES NFR BLD AUTO: 1.2 % (ref 0–0.5)
IMM GRANULOCYTES NFR BLD AUTO: 1.4 % (ref 0–0.5)
IMM GRANULOCYTES NFR BLD AUTO: 1.6 % (ref 0–0.5)
INHALED O2 CONCENTRATION: 100 %
INHALED O2 CONCENTRATION: 40 %
INHALED O2 CONCENTRATION: 45 %
INHALED O2 CONCENTRATION: 60 %
INHALED O2 CONCENTRATION: 60 %
INR PPP: 1.17 (ref 0.86–1.15)
KETONES UR QL STRIP: ABNORMAL
KETONES UR QL STRIP: NEGATIVE
LACTATE BLDA-SCNC: 10.18 MMOL/L (ref 0.5–2)
LACTATE BLDA-SCNC: 2.92 MMOL/L (ref 0.5–2)
LACTATE BLDA-SCNC: 6.06 MMOL/L (ref 0.5–2)
LACTATE BLDA-SCNC: 6.92 MMOL/L (ref 0.5–2)
LACTATE BLDA-SCNC: 7.97 MMOL/L (ref 0.5–2)
LARGE PLATELETS: ABNORMAL
LDLC SERPL CALC-MCNC: 13 MG/DL (ref 0–100)
LDLC/HDLC SERPL: 1.87 {RATIO}
LEFT ATRIUM VOLUME INDEX: 34.7 ML/M2
LEUKOCYTE ESTERASE UR QL STRIP.AUTO: ABNORMAL
LEUKOCYTE ESTERASE UR QL STRIP.AUTO: NEGATIVE
LYMPHOCYTES # BLD AUTO: 0.52 10*3/MM3 (ref 0.7–3.1)
LYMPHOCYTES # BLD AUTO: 0.57 10*3/MM3 (ref 0.7–3.1)
LYMPHOCYTES # BLD AUTO: 0.8 10*3/MM3 (ref 0.7–3.1)
LYMPHOCYTES # BLD AUTO: 1.01 10*3/MM3 (ref 0.7–3.1)
LYMPHOCYTES # BLD AUTO: 1.14 10*3/MM3 (ref 0.7–3.1)
LYMPHOCYTES # BLD AUTO: 1.34 10*3/MM3 (ref 0.7–3.1)
LYMPHOCYTES # BLD MANUAL: 5.08 10*3/MM3 (ref 0.7–3.1)
LYMPHOCYTES NFR BLD AUTO: 11.1 % (ref 19.6–45.3)
LYMPHOCYTES NFR BLD AUTO: 4.4 % (ref 19.6–45.3)
LYMPHOCYTES NFR BLD AUTO: 4.9 % (ref 19.6–45.3)
LYMPHOCYTES NFR BLD AUTO: 7.5 % (ref 19.6–45.3)
LYMPHOCYTES NFR BLD AUTO: 8.1 % (ref 19.6–45.3)
LYMPHOCYTES NFR BLD AUTO: 9.2 % (ref 19.6–45.3)
LYMPHOCYTES NFR BLD MANUAL: 7 % (ref 5–12)
MAGNESIUM SERPL-MCNC: 1.6 MG/DL (ref 1.6–2.4)
MAGNESIUM SERPL-MCNC: 1.9 MG/DL (ref 1.6–2.4)
MAGNESIUM SERPL-MCNC: 2.1 MG/DL (ref 1.6–2.4)
MAGNESIUM SERPL-MCNC: 2.5 MG/DL (ref 1.6–2.4)
MAGNESIUM SERPL-MCNC: 2.5 MG/DL (ref 1.6–2.4)
MAGNESIUM SERPL-MCNC: 2.6 MG/DL (ref 1.6–2.4)
MAGNESIUM SERPL-MCNC: 2.9 MG/DL (ref 1.6–2.4)
MCH RBC QN AUTO: 29.6 PG (ref 26.6–33)
MCH RBC QN AUTO: 29.6 PG (ref 26.6–33)
MCH RBC QN AUTO: 29.7 PG (ref 26.6–33)
MCH RBC QN AUTO: 29.7 PG (ref 26.6–33)
MCH RBC QN AUTO: 30.5 PG (ref 26.6–33)
MCH RBC QN AUTO: 30.7 PG (ref 26.6–33)
MCH RBC QN AUTO: 31 PG (ref 26.6–33)
MCHC RBC AUTO-ENTMCNC: 30.8 G/DL (ref 31.5–35.7)
MCHC RBC AUTO-ENTMCNC: 32.1 G/DL (ref 31.5–35.7)
MCHC RBC AUTO-ENTMCNC: 32.4 G/DL (ref 31.5–35.7)
MCHC RBC AUTO-ENTMCNC: 32.9 G/DL (ref 31.5–35.7)
MCHC RBC AUTO-ENTMCNC: 33 G/DL (ref 31.5–35.7)
MCHC RBC AUTO-ENTMCNC: 33.7 G/DL (ref 31.5–35.7)
MCHC RBC AUTO-ENTMCNC: 34.5 G/DL (ref 31.5–35.7)
MCV RBC AUTO: 88.2 FL (ref 79–97)
MCV RBC AUTO: 90 FL (ref 79–97)
MCV RBC AUTO: 90.2 FL (ref 79–97)
MCV RBC AUTO: 92.1 FL (ref 79–97)
MCV RBC AUTO: 92.5 FL (ref 79–97)
MCV RBC AUTO: 94.8 FL (ref 79–97)
MCV RBC AUTO: 95.9 FL (ref 79–97)
METHGB BLD QL: 0.1 % (ref 0–1.5)
METHGB BLD QL: 0.1 % (ref 0–1.5)
METHGB BLD QL: 0.2 % (ref 0–1.5)
METHGB BLD QL: 0.3 % (ref 0–1.5)
METHGB BLD QL: 0.3 % (ref 0–1.5)
MICROCYTES BLD QL: ABNORMAL
MODALITY: ABNORMAL
MONOCYTES # BLD AUTO: 0.55 10*3/MM3 (ref 0.1–0.9)
MONOCYTES # BLD AUTO: 0.64 10*3/MM3 (ref 0.1–0.9)
MONOCYTES # BLD AUTO: 0.77 10*3/MM3 (ref 0.1–0.9)
MONOCYTES # BLD AUTO: 0.99 10*3/MM3 (ref 0.1–0.9)
MONOCYTES # BLD AUTO: 2.31 10*3/MM3 (ref 0.1–0.9)
MONOCYTES # BLD AUTO: 2.35 10*3/MM3 (ref 0.1–0.9)
MONOCYTES # BLD: 1.54 10*3/MM3 (ref 0.1–0.9)
MONOCYTES NFR BLD AUTO: 11.4 % (ref 5–12)
MONOCYTES NFR BLD AUTO: 8.2 % (ref 5–12)
MONOCYTES NFR BLD AUTO: 8.5 % (ref 5–12)
MONOCYTES NFR BLD AUTO: 8.6 % (ref 5–12)
MONOCYTES NFR BLD AUTO: 8.8 % (ref 5–12)
MONOCYTES NFR BLD AUTO: 8.9 % (ref 5–12)
MRSA DNA SPEC QL NAA+PROBE: NORMAL
MUCOUS THREADS URNS QL MICRO: ABNORMAL /HPF
NEUTROPHILS # BLD AUTO: 15.01 10*3/MM3 (ref 1.7–7)
NEUTROPHILS NFR BLD AUTO: 17.06 10*3/MM3 (ref 1.7–7)
NEUTROPHILS NFR BLD AUTO: 22.18 10*3/MM3 (ref 1.7–7)
NEUTROPHILS NFR BLD AUTO: 5.16 10*3/MM3 (ref 1.7–7)
NEUTROPHILS NFR BLD AUTO: 6.18 10*3/MM3 (ref 1.7–7)
NEUTROPHILS NFR BLD AUTO: 6.99 10*3/MM3 (ref 1.7–7)
NEUTROPHILS NFR BLD AUTO: 75.7 % (ref 42.7–76)
NEUTROPHILS NFR BLD AUTO: 80.5 % (ref 42.7–76)
NEUTROPHILS NFR BLD AUTO: 80.9 % (ref 42.7–76)
NEUTROPHILS NFR BLD AUTO: 81.6 % (ref 42.7–76)
NEUTROPHILS NFR BLD AUTO: 82.8 % (ref 42.7–76)
NEUTROPHILS NFR BLD AUTO: 85 % (ref 42.7–76)
NEUTROPHILS NFR BLD AUTO: 9.11 10*3/MM3 (ref 1.7–7)
NEUTROPHILS NFR BLD MANUAL: 67 % (ref 42.7–76)
NEUTS BAND NFR BLD MANUAL: 1 % (ref 0–5)
NITRITE UR QL STRIP: NEGATIVE
NITRITE UR QL STRIP: NEGATIVE
NOTE: ABNORMAL
NRBC BLD AUTO-RTO: 0 /100 WBC (ref 0–0.2)
NT-PROBNP SERPL-MCNC: 200.4 PG/ML (ref 0–900)
OVALOCYTES BLD QL SMEAR: ABNORMAL
OVALOCYTES BLD QL SMEAR: NORMAL
OXYHGB MFR BLDV: 76.6 % (ref 94–99)
OXYHGB MFR BLDV: 88.8 % (ref 94–99)
OXYHGB MFR BLDV: 93.4 % (ref 94–99)
OXYHGB MFR BLDV: 95.9 % (ref 94–99)
OXYHGB MFR BLDV: 96.1 % (ref 94–99)
PCO2 BLDA: 40.8 MM HG (ref 35–45)
PCO2 BLDA: 40.9 MM HG (ref 35–45)
PCO2 BLDA: 44.4 MM HG (ref 35–45)
PCO2 BLDA: 50.9 MM HG (ref 35–45)
PCO2 BLDA: 90.3 MM HG (ref 35–45)
PH BLDA: 7.03 PH UNITS (ref 7.35–7.45)
PH BLDA: 7.16 PH UNITS (ref 7.35–7.45)
PH BLDA: 7.18 PH UNITS (ref 7.35–7.45)
PH BLDA: 7.19 PH UNITS (ref 7.35–7.45)
PH BLDA: 7.29 PH UNITS (ref 7.35–7.45)
PH UR STRIP.AUTO: 5.5 [PH] (ref 5–8)
PH UR STRIP.AUTO: <=5 [PH] (ref 5–8)
PHOSPHATE SERPL-MCNC: 2.3 MG/DL (ref 2.5–4.5)
PHOSPHATE SERPL-MCNC: 2.9 MG/DL (ref 2.5–4.5)
PHOSPHATE SERPL-MCNC: 3.2 MG/DL (ref 2.5–4.5)
PHOSPHATE SERPL-MCNC: 4.2 MG/DL (ref 2.5–4.5)
PHOSPHATE SERPL-MCNC: 4.9 MG/DL (ref 2.5–4.5)
PHOSPHATE SERPL-MCNC: 5.7 MG/DL (ref 2.5–4.5)
PLATELET # BLD AUTO: 105 10*3/MM3 (ref 140–450)
PLATELET # BLD AUTO: 115 10*3/MM3 (ref 140–450)
PLATELET # BLD AUTO: 121 10*3/MM3 (ref 140–450)
PLATELET # BLD AUTO: 173 10*3/MM3 (ref 140–450)
PLATELET # BLD AUTO: 178 10*3/MM3 (ref 140–450)
PLATELET # BLD AUTO: 242 10*3/MM3 (ref 140–450)
PLATELET # BLD AUTO: 286 10*3/MM3 (ref 140–450)
PMV BLD AUTO: 10 FL (ref 6–12)
PMV BLD AUTO: 10.1 FL (ref 6–12)
PMV BLD AUTO: 9.1 FL (ref 6–12)
PMV BLD AUTO: 9.1 FL (ref 6–12)
PMV BLD AUTO: 9.4 FL (ref 6–12)
PMV BLD AUTO: 9.6 FL (ref 6–12)
PMV BLD AUTO: 9.6 FL (ref 6–12)
PO2 BLD: 132 MM[HG] (ref 0–500)
PO2 BLD: 170 MM[HG] (ref 0–500)
PO2 BLD: 171 MM[HG] (ref 0–500)
PO2 BLD: 223 MM[HG] (ref 0–500)
PO2 BLD: 61 MM[HG] (ref 0–500)
PO2 BLDA: 100.5 MM HG (ref 80–100)
PO2 BLDA: 102 MM HG (ref 80–100)
PO2 BLDA: 60.7 MM HG (ref 80–100)
PO2 BLDA: 68.3 MM HG (ref 80–100)
PO2 BLDA: 79.1 MM HG (ref 80–100)
POLYCHROMASIA BLD QL SMEAR: NORMAL
POTASSIUM BLDA-SCNC: 2.71 MMOL/L (ref 3.5–5)
POTASSIUM BLDA-SCNC: 3.25 MMOL/L (ref 3.5–5)
POTASSIUM BLDA-SCNC: 3.74 MMOL/L (ref 3.5–5)
POTASSIUM BLDA-SCNC: 4.06 MMOL/L (ref 3.5–5)
POTASSIUM BLDA-SCNC: 4.25 MMOL/L (ref 3.5–5)
POTASSIUM SERPL-SCNC: 2.7 MMOL/L (ref 3.5–5.2)
POTASSIUM SERPL-SCNC: 3.4 MMOL/L (ref 3.5–5.2)
POTASSIUM SERPL-SCNC: 4 MMOL/L (ref 3.5–5.2)
POTASSIUM SERPL-SCNC: 4.1 MMOL/L (ref 3.5–5.2)
POTASSIUM SERPL-SCNC: 4.1 MMOL/L (ref 3.5–5.2)
POTASSIUM SERPL-SCNC: 4.6 MMOL/L (ref 3.5–5.2)
POTASSIUM SERPL-SCNC: 5.2 MMOL/L (ref 3.5–5.2)
PROT SERPL-MCNC: 5.4 G/DL (ref 6–8.5)
PROT SERPL-MCNC: 5.5 G/DL (ref 6–8.5)
PROT SERPL-MCNC: 5.7 G/DL (ref 6–8.5)
PROT SERPL-MCNC: 5.9 G/DL (ref 6–8.5)
PROT SERPL-MCNC: 6.1 G/DL (ref 6–8.5)
PROT SERPL-MCNC: 6.3 G/DL (ref 6–8.5)
PROT SERPL-MCNC: 6.7 G/DL (ref 6–8.5)
PROT UR QL STRIP: ABNORMAL
PROT UR QL STRIP: ABNORMAL
PROTHROMBIN TIME: 15 SECONDS (ref 11.8–14.9)
QT INTERVAL: 345 MS
QT INTERVAL: 353 MS
QT INTERVAL: 411 MS
QT INTERVAL: 412 MS
QT INTERVAL: 496 MS
QTC INTERVAL: 410 MS
QTC INTERVAL: 465 MS
QTC INTERVAL: 497 MS
QTC INTERVAL: 575 MS
QTC INTERVAL: 593 MS
RBC # BLD AUTO: 3.98 10*6/MM3 (ref 4.14–5.8)
RBC # BLD AUTO: 4.31 10*6/MM3 (ref 4.14–5.8)
RBC # BLD AUTO: 4.76 10*6/MM3 (ref 4.14–5.8)
RBC # BLD AUTO: 4.85 10*6/MM3 (ref 4.14–5.8)
RBC # BLD AUTO: 5.16 10*6/MM3 (ref 4.14–5.8)
RBC # BLD AUTO: 5.22 10*6/MM3 (ref 4.14–5.8)
RBC # BLD AUTO: 5.31 10*6/MM3 (ref 4.14–5.8)
RBC # UR STRIP: ABNORMAL /HPF
RBC # UR STRIP: ABNORMAL /HPF
REF LAB TEST METHOD: ABNORMAL
REF LAB TEST METHOD: ABNORMAL
SAO2 % BLDCOA: 77.1 % (ref 95–99)
SAO2 % BLDCOA: 89.2 % (ref 95–99)
SAO2 % BLDCOA: 93.8 % (ref 95–99)
SAO2 % BLDCOA: 96.2 % (ref 95–99)
SAO2 % BLDCOA: 96.3 % (ref 95–99)
SMALL PLATELETS BLD QL SMEAR: ADEQUATE
SMALL PLATELETS BLD QL SMEAR: NORMAL
SMUDGE CELLS BLD QL SMEAR: ABNORMAL
SODIUM BLDA-SCNC: 135.6 MMOL/L (ref 136–146)
SODIUM BLDA-SCNC: 139.7 MMOL/L (ref 136–146)
SODIUM BLDA-SCNC: 139.9 MMOL/L (ref 136–146)
SODIUM BLDA-SCNC: 140.1 MMOL/L (ref 136–146)
SODIUM BLDA-SCNC: 145.4 MMOL/L (ref 136–146)
SODIUM SERPL-SCNC: 137 MMOL/L (ref 136–145)
SODIUM SERPL-SCNC: 138 MMOL/L (ref 136–145)
SODIUM SERPL-SCNC: 141 MMOL/L (ref 136–145)
SODIUM SERPL-SCNC: 141 MMOL/L (ref 136–145)
SODIUM SERPL-SCNC: 142 MMOL/L (ref 136–145)
SP GR UR STRIP: 1.02 (ref 1–1.03)
SP GR UR STRIP: >1.03 (ref 1–1.03)
SQUAMOUS #/AREA URNS HPF: ABNORMAL /HPF
SQUAMOUS #/AREA URNS HPF: ABNORMAL /HPF
TRIGL SERPL-MCNC: 56 MG/DL (ref 0–150)
TROPONIN T DELTA: 113 NG/L
TROPONIN T SERPL HS-MCNC: 111 NG/L
TSH SERPL DL<=0.05 MIU/L-ACNC: 1.23 UIU/ML (ref 0.27–4.2)
UROBILINOGEN UR QL STRIP: ABNORMAL
UROBILINOGEN UR QL STRIP: ABNORMAL
VANCOMYCIN SERPL-MCNC: 10.8 MCG/ML (ref 5–40)
VARIANT LYMPHS NFR BLD MANUAL: 16 % (ref 19.6–45.3)
VARIANT LYMPHS NFR BLD MANUAL: 7 % (ref 0–5)
VLDLC SERPL-MCNC: 15 MG/DL (ref 5–40)
WBC # UR STRIP: ABNORMAL /HPF
WBC # UR STRIP: ABNORMAL /HPF
WBC MORPH BLD: NORMAL
WBC NRBC COR # BLD: 12.04 10*3/MM3 (ref 3.4–10.8)
WBC NRBC COR # BLD: 20.61 10*3/MM3 (ref 3.4–10.8)
WBC NRBC COR # BLD: 22.07 10*3/MM3 (ref 3.4–10.8)
WBC NRBC COR # BLD: 26.12 10*3/MM3 (ref 3.4–10.8)
WBC NRBC COR # BLD: 6.41 10*3/MM3 (ref 3.4–10.8)
WBC NRBC COR # BLD: 7.57 10*3/MM3 (ref 3.4–10.8)
WBC NRBC COR # BLD: 8.65 10*3/MM3 (ref 3.4–10.8)

## 2023-01-01 PROCEDURE — 93306 TTE W/DOPPLER COMPLETE: CPT

## 2023-01-01 PROCEDURE — 85025 COMPLETE CBC W/AUTO DIFF WBC: CPT | Performed by: STUDENT IN AN ORGANIZED HEALTH CARE EDUCATION/TRAINING PROGRAM

## 2023-01-01 PROCEDURE — 94799 UNLISTED PULMONARY SVC/PX: CPT

## 2023-01-01 PROCEDURE — 83050 HGB METHEMOGLOBIN QUAN: CPT | Performed by: EMERGENCY MEDICINE

## 2023-01-01 PROCEDURE — 82805 BLOOD GASES W/O2 SATURATION: CPT | Performed by: STUDENT IN AN ORGANIZED HEALTH CARE EDUCATION/TRAINING PROGRAM

## 2023-01-01 PROCEDURE — 94003 VENT MGMT INPAT SUBQ DAY: CPT

## 2023-01-01 PROCEDURE — 82948 REAGENT STRIP/BLOOD GLUCOSE: CPT

## 2023-01-01 PROCEDURE — 87205 SMEAR GRAM STAIN: CPT | Performed by: NURSE PRACTITIONER

## 2023-01-01 PROCEDURE — 83735 ASSAY OF MAGNESIUM: CPT | Performed by: STUDENT IN AN ORGANIZED HEALTH CARE EDUCATION/TRAINING PROGRAM

## 2023-01-01 PROCEDURE — 70551 MRI BRAIN STEM W/O DYE: CPT

## 2023-01-01 PROCEDURE — 74018 RADEX ABDOMEN 1 VIEW: CPT

## 2023-01-01 PROCEDURE — 93010 ELECTROCARDIOGRAM REPORT: CPT | Performed by: INTERNAL MEDICINE

## 2023-01-01 PROCEDURE — 84443 ASSAY THYROID STIM HORMONE: CPT | Performed by: FAMILY MEDICINE

## 2023-01-01 PROCEDURE — 99285 EMERGENCY DEPT VISIT HI MDM: CPT

## 2023-01-01 PROCEDURE — 84484 ASSAY OF TROPONIN QUANT: CPT | Performed by: EMERGENCY MEDICINE

## 2023-01-01 PROCEDURE — 81001 URINALYSIS AUTO W/SCOPE: CPT | Performed by: NURSE PRACTITIONER

## 2023-01-01 PROCEDURE — 83880 ASSAY OF NATRIURETIC PEPTIDE: CPT | Performed by: EMERGENCY MEDICINE

## 2023-01-01 PROCEDURE — 84100 ASSAY OF PHOSPHORUS: CPT | Performed by: STUDENT IN AN ORGANIZED HEALTH CARE EDUCATION/TRAINING PROGRAM

## 2023-01-01 PROCEDURE — 82330 ASSAY OF CALCIUM: CPT | Performed by: STUDENT IN AN ORGANIZED HEALTH CARE EDUCATION/TRAINING PROGRAM

## 2023-01-01 PROCEDURE — 87086 URINE CULTURE/COLONY COUNT: CPT | Performed by: NURSE PRACTITIONER

## 2023-01-01 PROCEDURE — 25010000002 ACETAMINOPHEN 10 MG/ML SOLUTION: Performed by: STUDENT IN AN ORGANIZED HEALTH CARE EDUCATION/TRAINING PROGRAM

## 2023-01-01 PROCEDURE — 25010000002 VANCOMYCIN 5 G RECONSTITUTED SOLUTION: Performed by: STUDENT IN AN ORGANIZED HEALTH CARE EDUCATION/TRAINING PROGRAM

## 2023-01-01 PROCEDURE — 80053 COMPREHEN METABOLIC PANEL: CPT | Performed by: STUDENT IN AN ORGANIZED HEALTH CARE EDUCATION/TRAINING PROGRAM

## 2023-01-01 PROCEDURE — 99291 CRITICAL CARE FIRST HOUR: CPT | Performed by: STUDENT IN AN ORGANIZED HEALTH CARE EDUCATION/TRAINING PROGRAM

## 2023-01-01 PROCEDURE — 85730 THROMBOPLASTIN TIME PARTIAL: CPT | Performed by: STUDENT IN AN ORGANIZED HEALTH CARE EDUCATION/TRAINING PROGRAM

## 2023-01-01 PROCEDURE — 25010000002 MAGNESIUM SULFATE PER 500 MG OF MAGNESIUM: Performed by: EMERGENCY MEDICINE

## 2023-01-01 PROCEDURE — 25010000002 HEPARIN (PORCINE) 25000-0.45 UT/250ML-% SOLUTION: Performed by: FAMILY MEDICINE

## 2023-01-01 PROCEDURE — 25010000002 AMIODARONE IN DEXTROSE 5% 360-4.14 MG/200ML-% SOLUTION: Performed by: PHYSICIAN ASSISTANT

## 2023-01-01 PROCEDURE — 83605 ASSAY OF LACTIC ACID: CPT | Performed by: STUDENT IN AN ORGANIZED HEALTH CARE EDUCATION/TRAINING PROGRAM

## 2023-01-01 PROCEDURE — 83735 ASSAY OF MAGNESIUM: CPT | Performed by: EMERGENCY MEDICINE

## 2023-01-01 PROCEDURE — 80202 ASSAY OF VANCOMYCIN: CPT | Performed by: STUDENT IN AN ORGANIZED HEALTH CARE EDUCATION/TRAINING PROGRAM

## 2023-01-01 PROCEDURE — 87641 MR-STAPH DNA AMP PROBE: CPT | Performed by: STUDENT IN AN ORGANIZED HEALTH CARE EDUCATION/TRAINING PROGRAM

## 2023-01-01 PROCEDURE — 25010000002 EPINEPHRINE 1 MG/ML SOLUTION: Performed by: FAMILY MEDICINE

## 2023-01-01 PROCEDURE — 25010000002 EPINEPHRINE 1 MG/ML SOLUTION 30 ML VIAL: Performed by: FAMILY MEDICINE

## 2023-01-01 PROCEDURE — 93306 TTE W/DOPPLER COMPLETE: CPT | Performed by: INTERNAL MEDICINE

## 2023-01-01 PROCEDURE — 5A1945Z RESPIRATORY VENTILATION, 24-96 CONSECUTIVE HOURS: ICD-10-PCS | Performed by: INTERNAL MEDICINE

## 2023-01-01 PROCEDURE — 85007 BL SMEAR W/DIFF WBC COUNT: CPT | Performed by: STUDENT IN AN ORGANIZED HEALTH CARE EDUCATION/TRAINING PROGRAM

## 2023-01-01 PROCEDURE — 05HY33Z INSERTION OF INFUSION DEVICE INTO UPPER VEIN, PERCUTANEOUS APPROACH: ICD-10-PCS | Performed by: STUDENT IN AN ORGANIZED HEALTH CARE EDUCATION/TRAINING PROGRAM

## 2023-01-01 PROCEDURE — 85007 BL SMEAR W/DIFF WBC COUNT: CPT | Performed by: EMERGENCY MEDICINE

## 2023-01-01 PROCEDURE — 81001 URINALYSIS AUTO W/SCOPE: CPT | Performed by: EMERGENCY MEDICINE

## 2023-01-01 PROCEDURE — 87040 BLOOD CULTURE FOR BACTERIA: CPT | Performed by: EMERGENCY MEDICINE

## 2023-01-01 PROCEDURE — 93005 ELECTROCARDIOGRAM TRACING: CPT

## 2023-01-01 PROCEDURE — 80061 LIPID PANEL: CPT | Performed by: FAMILY MEDICINE

## 2023-01-01 PROCEDURE — 63710000001 INSULIN LISPRO (HUMAN) PER 5 UNITS: Performed by: FAMILY MEDICINE

## 2023-01-01 PROCEDURE — 93005 ELECTROCARDIOGRAM TRACING: CPT | Performed by: FAMILY MEDICINE

## 2023-01-01 PROCEDURE — 0 CEFEPIME PER 500 MG: Performed by: FAMILY MEDICINE

## 2023-01-01 PROCEDURE — 0 POTASSIUM CHLORIDE 10 MEQ/100ML SOLUTION: Performed by: EMERGENCY MEDICINE

## 2023-01-01 PROCEDURE — 83050 HGB METHEMOGLOBIN QUAN: CPT | Performed by: STUDENT IN AN ORGANIZED HEALTH CARE EDUCATION/TRAINING PROGRAM

## 2023-01-01 PROCEDURE — 83605 ASSAY OF LACTIC ACID: CPT | Performed by: EMERGENCY MEDICINE

## 2023-01-01 PROCEDURE — 82805 BLOOD GASES W/O2 SATURATION: CPT | Performed by: EMERGENCY MEDICINE

## 2023-01-01 PROCEDURE — 25010000002 CALCIUM GLUCONATE-NACL 1-0.675 GM/50ML-% SOLUTION: Performed by: NURSE PRACTITIONER

## 2023-01-01 PROCEDURE — 92950 HEART/LUNG RESUSCITATION CPR: CPT

## 2023-01-01 PROCEDURE — 95819 EEG AWAKE AND ASLEEP: CPT

## 2023-01-01 PROCEDURE — 25010000002 AMIODARONE IN DEXTROSE 5% 150-4.21 MG/100ML-% SOLUTION: Performed by: EMERGENCY MEDICINE

## 2023-01-01 PROCEDURE — 71045 X-RAY EXAM CHEST 1 VIEW: CPT

## 2023-01-01 PROCEDURE — 25010000002 VANCOMYCIN 5 G RECONSTITUTED SOLUTION: Performed by: FAMILY MEDICINE

## 2023-01-01 PROCEDURE — 85730 THROMBOPLASTIN TIME PARTIAL: CPT | Performed by: INTERNAL MEDICINE

## 2023-01-01 PROCEDURE — 36415 COLL VENOUS BLD VENIPUNCTURE: CPT

## 2023-01-01 PROCEDURE — 25010000002 AMIODARONE IN DEXTROSE 5% 360-4.14 MG/200ML-% SOLUTION: Performed by: FAMILY MEDICINE

## 2023-01-01 PROCEDURE — 85730 THROMBOPLASTIN TIME PARTIAL: CPT | Performed by: EMERGENCY MEDICINE

## 2023-01-01 PROCEDURE — 25010000002 AMPICILLIN-SULBACTAM PER 1.5 G: Performed by: STUDENT IN AN ORGANIZED HEALTH CARE EDUCATION/TRAINING PROGRAM

## 2023-01-01 PROCEDURE — 76937 US GUIDE VASCULAR ACCESS: CPT | Performed by: STUDENT IN AN ORGANIZED HEALTH CARE EDUCATION/TRAINING PROGRAM

## 2023-01-01 PROCEDURE — 03HY32Z INSERTION OF MONITORING DEVICE INTO UPPER ARTERY, PERCUTANEOUS APPROACH: ICD-10-PCS | Performed by: STUDENT IN AN ORGANIZED HEALTH CARE EDUCATION/TRAINING PROGRAM

## 2023-01-01 PROCEDURE — 87040 BLOOD CULTURE FOR BACTERIA: CPT | Performed by: FAMILY MEDICINE

## 2023-01-01 PROCEDURE — 25010000002 EPINEPHRINE 1 MG/ML SOLUTION: Performed by: EMERGENCY MEDICINE

## 2023-01-01 PROCEDURE — 85610 PROTHROMBIN TIME: CPT | Performed by: EMERGENCY MEDICINE

## 2023-01-01 PROCEDURE — 25010000002 HEPARIN (PORCINE) 25000-0.45 UT/250ML-% SOLUTION: Performed by: EMERGENCY MEDICINE

## 2023-01-01 PROCEDURE — 0 MAGNESIUM SULFATE 4 GM/100ML SOLUTION: Performed by: PHYSICIAN ASSISTANT

## 2023-01-01 PROCEDURE — 25010000002 LORAZEPAM PER 2 MG: Performed by: STUDENT IN AN ORGANIZED HEALTH CARE EDUCATION/TRAINING PROGRAM

## 2023-01-01 PROCEDURE — 87070 CULTURE OTHR SPECIMN AEROBIC: CPT | Performed by: NURSE PRACTITIONER

## 2023-01-01 PROCEDURE — 82375 ASSAY CARBOXYHB QUANT: CPT | Performed by: STUDENT IN AN ORGANIZED HEALTH CARE EDUCATION/TRAINING PROGRAM

## 2023-01-01 PROCEDURE — 70450 CT HEAD/BRAIN W/O DYE: CPT

## 2023-01-01 PROCEDURE — 0 POTASSIUM CHLORIDE 10 MEQ/100ML SOLUTION: Performed by: FAMILY MEDICINE

## 2023-01-01 PROCEDURE — 25010000002 HEPARIN (PORCINE) 25000-0.45 UT/250ML-% SOLUTION: Performed by: STUDENT IN AN ORGANIZED HEALTH CARE EDUCATION/TRAINING PROGRAM

## 2023-01-01 PROCEDURE — 93005 ELECTROCARDIOGRAM TRACING: CPT | Performed by: EMERGENCY MEDICINE

## 2023-01-01 PROCEDURE — 82550 ASSAY OF CK (CPK): CPT | Performed by: EMERGENCY MEDICINE

## 2023-01-01 PROCEDURE — 85025 COMPLETE CBC W/AUTO DIFF WBC: CPT | Performed by: EMERGENCY MEDICINE

## 2023-01-01 PROCEDURE — 25010000002 EPINEPHRINE 1 MG/10ML SOLUTION PREFILLED SYRINGE: Performed by: EMERGENCY MEDICINE

## 2023-01-01 PROCEDURE — 25010000002 MORPHINE PER 10 MG: Performed by: FAMILY MEDICINE

## 2023-01-01 PROCEDURE — 82375 ASSAY CARBOXYHB QUANT: CPT | Performed by: EMERGENCY MEDICINE

## 2023-01-01 PROCEDURE — 99223 1ST HOSP IP/OBS HIGH 75: CPT | Performed by: FAMILY MEDICINE

## 2023-01-01 PROCEDURE — 25010000002 SULFUR HEXAFLUORIDE MICROSPH 60.7-25 MG RECONSTITUTED SUSPENSION: Performed by: INTERNAL MEDICINE

## 2023-01-01 PROCEDURE — 80053 COMPREHEN METABOLIC PANEL: CPT | Performed by: EMERGENCY MEDICINE

## 2023-01-01 PROCEDURE — 25010000002 CALCIUM GLUCONATE 2-0.675 GM/100ML-% SOLUTION: Performed by: PHYSICIAN ASSISTANT

## 2023-01-01 PROCEDURE — 25010000002 PROPOFOL 10 MG/ML EMULSION

## 2023-01-01 PROCEDURE — 36600 WITHDRAWAL OF ARTERIAL BLOOD: CPT | Performed by: STUDENT IN AN ORGANIZED HEALTH CARE EDUCATION/TRAINING PROGRAM

## 2023-01-01 PROCEDURE — 36620 INSERTION CATHETER ARTERY: CPT | Performed by: STUDENT IN AN ORGANIZED HEALTH CARE EDUCATION/TRAINING PROGRAM

## 2023-01-01 PROCEDURE — 83036 HEMOGLOBIN GLYCOSYLATED A1C: CPT | Performed by: FAMILY MEDICINE

## 2023-01-01 PROCEDURE — 94002 VENT MGMT INPAT INIT DAY: CPT

## 2023-01-01 PROCEDURE — 25010000002 AMIODARONE IN DEXTROSE 5% 360-4.14 MG/200ML-% SOLUTION: Performed by: EMERGENCY MEDICINE

## 2023-01-01 PROCEDURE — 63710000001 INSULIN REGULAR HUMAN PER 5 UNITS: Performed by: NURSE PRACTITIONER

## 2023-01-01 RX ORDER — MAGNESIUM SULFATE HEPTAHYDRATE 40 MG/ML
4 INJECTION, SOLUTION INTRAVENOUS ONCE
Status: COMPLETED | OUTPATIENT
Start: 2023-01-01 | End: 2023-01-01

## 2023-01-01 RX ORDER — SODIUM CHLORIDE 0.9 % (FLUSH) 0.9 %
10 SYRINGE (ML) INJECTION AS NEEDED
Status: DISCONTINUED | OUTPATIENT
Start: 2023-01-01 | End: 2023-01-01 | Stop reason: HOSPADM

## 2023-01-01 RX ORDER — POTASSIUM CHLORIDE 7.45 MG/ML
10 INJECTION INTRAVENOUS
Status: COMPLETED | OUTPATIENT
Start: 2023-01-01 | End: 2023-01-01

## 2023-01-01 RX ORDER — PANTOPRAZOLE SODIUM 40 MG/10ML
40 INJECTION, POWDER, LYOPHILIZED, FOR SOLUTION INTRAVENOUS DAILY
Status: DISCONTINUED | OUTPATIENT
Start: 2023-01-01 | End: 2023-01-01

## 2023-01-01 RX ORDER — NALOXONE HCL 0.4 MG/ML
0.4 VIAL (ML) INJECTION
Status: DISCONTINUED | OUTPATIENT
Start: 2023-01-01 | End: 2023-01-01 | Stop reason: HOSPADM

## 2023-01-01 RX ORDER — AMOXICILLIN 250 MG
2 CAPSULE ORAL 2 TIMES DAILY
Status: DISCONTINUED | OUTPATIENT
Start: 2023-01-01 | End: 2023-01-01

## 2023-01-01 RX ORDER — BISACODYL 5 MG/1
5 TABLET, DELAYED RELEASE ORAL DAILY PRN
Status: DISCONTINUED | OUTPATIENT
Start: 2023-01-01 | End: 2023-01-01 | Stop reason: SDUPTHER

## 2023-01-01 RX ORDER — INSULIN LISPRO 100 [IU]/ML
2-9 INJECTION, SOLUTION INTRAVENOUS; SUBCUTANEOUS
Status: DISCONTINUED | OUTPATIENT
Start: 2023-01-01 | End: 2023-01-01

## 2023-01-01 RX ORDER — ATORVASTATIN CALCIUM 40 MG/1
80 TABLET, FILM COATED ORAL NIGHTLY
Status: DISCONTINUED | OUTPATIENT
Start: 2023-01-01 | End: 2023-01-01

## 2023-01-01 RX ORDER — FENTANYL CITRATE-0.9 % NACL/PF 10 MCG/ML
50-300 PLASTIC BAG, INJECTION (ML) INTRAVENOUS CONTINUOUS PRN
Status: DISCONTINUED | OUTPATIENT
Start: 2023-01-01 | End: 2023-01-01

## 2023-01-01 RX ORDER — NICOTINE POLACRILEX 4 MG
15 LOZENGE BUCCAL
Status: DISCONTINUED | OUTPATIENT
Start: 2023-01-01 | End: 2023-01-01 | Stop reason: SDUPTHER

## 2023-01-01 RX ORDER — DEXTROSE MONOHYDRATE 25 G/50ML
25 INJECTION, SOLUTION INTRAVENOUS
Status: DISCONTINUED | OUTPATIENT
Start: 2023-01-01 | End: 2023-01-01 | Stop reason: SDUPTHER

## 2023-01-01 RX ORDER — BISACODYL 10 MG
10 SUPPOSITORY, RECTAL RECTAL DAILY PRN
Status: DISCONTINUED | OUTPATIENT
Start: 2023-01-01 | End: 2023-01-01

## 2023-01-01 RX ORDER — WHITE PETROLATUM 57.7 %-MINERAL OIL 31.9 % EYE OINTMENT
1
Status: DISCONTINUED | OUTPATIENT
Start: 2023-01-01 | End: 2023-01-01 | Stop reason: HOSPADM

## 2023-01-01 RX ORDER — ACETAMINOPHEN 650 MG/1
650 SUPPOSITORY RECTAL EVERY 4 HOURS PRN
Status: DISCONTINUED | OUTPATIENT
Start: 2023-01-01 | End: 2023-01-01 | Stop reason: HOSPADM

## 2023-01-01 RX ORDER — CALCIUM GLUCONATE 20 MG/ML
2000 INJECTION, SOLUTION INTRAVENOUS ONCE
Status: COMPLETED | OUTPATIENT
Start: 2023-01-01 | End: 2023-01-01

## 2023-01-01 RX ORDER — PROPOFOL 10 MG/ML
VIAL (ML) INTRAVENOUS
Status: COMPLETED
Start: 2023-01-01 | End: 2023-01-01

## 2023-01-01 RX ORDER — WHITE PETROLATUM 57.7 %-MINERAL OIL 31.9 % EYE OINTMENT
Status: DISCONTINUED | OUTPATIENT
Start: 2023-01-01 | End: 2023-01-01

## 2023-01-01 RX ORDER — LORAZEPAM 2 MG/ML
2 INJECTION INTRAMUSCULAR
Status: DISCONTINUED | OUTPATIENT
Start: 2023-01-01 | End: 2023-01-01 | Stop reason: HOSPADM

## 2023-01-01 RX ORDER — POTASSIUM CHLORIDE 7.45 MG/ML
10 INJECTION INTRAVENOUS ONCE
Status: COMPLETED | OUTPATIENT
Start: 2023-01-01 | End: 2023-01-01

## 2023-01-01 RX ORDER — SODIUM CHLORIDE, SODIUM LACTATE, POTASSIUM CHLORIDE, CALCIUM CHLORIDE 600; 310; 30; 20 MG/100ML; MG/100ML; MG/100ML; MG/100ML
75 INJECTION, SOLUTION INTRAVENOUS CONTINUOUS
Status: ACTIVE | OUTPATIENT
Start: 2023-01-01 | End: 2023-01-01

## 2023-01-01 RX ORDER — ASPIRIN 81 MG/1
81 TABLET, CHEWABLE ORAL DAILY
Status: DISCONTINUED | OUTPATIENT
Start: 2023-01-01 | End: 2023-01-01

## 2023-01-01 RX ORDER — POLYETHYLENE GLYCOL 3350 17 G/17G
17 POWDER, FOR SOLUTION ORAL DAILY PRN
Status: DISCONTINUED | OUTPATIENT
Start: 2023-01-01 | End: 2023-01-01

## 2023-01-01 RX ORDER — NITROGLYCERIN 0.4 MG/1
0.4 TABLET SUBLINGUAL
Status: DISCONTINUED | OUTPATIENT
Start: 2023-01-01 | End: 2023-01-01 | Stop reason: HOSPADM

## 2023-01-01 RX ORDER — AMOXICILLIN 250 MG
2 CAPSULE ORAL 2 TIMES DAILY
Status: DISCONTINUED | OUTPATIENT
Start: 2023-01-01 | End: 2023-01-01 | Stop reason: SDUPTHER

## 2023-01-01 RX ORDER — BISACODYL 5 MG/1
5 TABLET, DELAYED RELEASE ORAL DAILY PRN
Status: DISCONTINUED | OUTPATIENT
Start: 2023-01-01 | End: 2023-01-01

## 2023-01-01 RX ORDER — MORPHINE SULFATE 2 MG/ML
2 INJECTION, SOLUTION INTRAMUSCULAR; INTRAVENOUS
Status: DISCONTINUED | OUTPATIENT
Start: 2023-01-01 | End: 2023-01-01 | Stop reason: HOSPADM

## 2023-01-01 RX ORDER — METOPROLOL SUCCINATE 25 MG/1
25 TABLET, EXTENDED RELEASE ORAL
Status: DISCONTINUED | OUTPATIENT
Start: 2023-01-01 | End: 2023-01-01

## 2023-01-01 RX ORDER — ACETAMINOPHEN 160 MG/5ML
650 SOLUTION ORAL EVERY 4 HOURS PRN
Status: DISCONTINUED | OUTPATIENT
Start: 2023-01-01 | End: 2023-01-01 | Stop reason: HOSPADM

## 2023-01-01 RX ORDER — MORPHINE SULFATE 2 MG/ML
1 INJECTION, SOLUTION INTRAMUSCULAR; INTRAVENOUS EVERY 4 HOURS PRN
Status: DISCONTINUED | OUTPATIENT
Start: 2023-01-01 | End: 2023-01-01 | Stop reason: HOSPADM

## 2023-01-01 RX ORDER — DEXTROSE MONOHYDRATE 25 G/50ML
25 INJECTION, SOLUTION INTRAVENOUS
Status: DISCONTINUED | OUTPATIENT
Start: 2023-01-01 | End: 2023-01-01 | Stop reason: HOSPADM

## 2023-01-01 RX ORDER — LORAZEPAM 2 MG/ML
1 INJECTION INTRAMUSCULAR
Status: DISCONTINUED | OUTPATIENT
Start: 2023-01-01 | End: 2023-01-01 | Stop reason: HOSPADM

## 2023-01-01 RX ORDER — HEPARIN SODIUM 10000 [USP'U]/100ML
10.6 INJECTION, SOLUTION INTRAVENOUS
Status: DISCONTINUED | OUTPATIENT
Start: 2023-01-01 | End: 2023-01-01

## 2023-01-01 RX ORDER — HEPARIN SODIUM 10000 [USP'U]/100ML
12 INJECTION, SOLUTION INTRAVENOUS
Status: DISCONTINUED | OUTPATIENT
Start: 2023-01-01 | End: 2023-01-01

## 2023-01-01 RX ORDER — MAGNESIUM SULFATE HEPTAHYDRATE 500 MG/ML
INJECTION, SOLUTION INTRAMUSCULAR; INTRAVENOUS
Status: COMPLETED | OUTPATIENT
Start: 2023-01-01 | End: 2023-01-01

## 2023-01-01 RX ORDER — ASPIRIN 300 MG/1
300 SUPPOSITORY RECTAL DAILY
Status: DISCONTINUED | OUTPATIENT
Start: 2023-01-01 | End: 2023-01-01

## 2023-01-01 RX ORDER — ACETAMINOPHEN 160 MG/5ML
650 SOLUTION ORAL EVERY 4 HOURS PRN
Status: DISCONTINUED | OUTPATIENT
Start: 2023-01-01 | End: 2023-01-01

## 2023-01-01 RX ORDER — LORAZEPAM 2 MG/ML
0.5 INJECTION INTRAMUSCULAR
Status: DISCONTINUED | OUTPATIENT
Start: 2023-01-01 | End: 2023-01-01 | Stop reason: HOSPADM

## 2023-01-01 RX ORDER — NOREPINEPHRINE BITARTRATE 0.03 MG/ML
.02-.3 INJECTION, SOLUTION INTRAVENOUS
Status: DISCONTINUED | OUTPATIENT
Start: 2023-01-01 | End: 2023-01-01 | Stop reason: HOSPADM

## 2023-01-01 RX ORDER — ACETAMINOPHEN 325 MG/1
650 TABLET ORAL EVERY 4 HOURS PRN
Status: DISCONTINUED | OUTPATIENT
Start: 2023-01-01 | End: 2023-01-01

## 2023-01-01 RX ORDER — MAGNESIUM SULFATE HEPTAHYDRATE 40 MG/ML
INJECTION, SOLUTION INTRAVENOUS
Status: DISPENSED
Start: 2023-01-01 | End: 2023-01-01

## 2023-01-01 RX ORDER — NITROGLYCERIN 0.4 MG/1
0.4 TABLET SUBLINGUAL
Status: DISCONTINUED | OUTPATIENT
Start: 2023-01-01 | End: 2023-01-01 | Stop reason: SDUPTHER

## 2023-01-01 RX ORDER — ACETAMINOPHEN 10 MG/ML
1000 INJECTION, SOLUTION INTRAVENOUS ONCE
Status: COMPLETED | OUTPATIENT
Start: 2023-01-01 | End: 2023-01-01

## 2023-01-01 RX ORDER — ACETAMINOPHEN 650 MG/1
650 SUPPOSITORY RECTAL EVERY 4 HOURS PRN
Status: DISCONTINUED | OUTPATIENT
Start: 2023-01-01 | End: 2023-01-01

## 2023-01-01 RX ORDER — DIPHENOXYLATE HYDROCHLORIDE AND ATROPINE SULFATE 2.5; .025 MG/1; MG/1
1 TABLET ORAL
Status: DISCONTINUED | OUTPATIENT
Start: 2023-01-01 | End: 2023-01-01 | Stop reason: HOSPADM

## 2023-01-01 RX ORDER — CALCIUM GLUCONATE 20 MG/ML
1000 INJECTION, SOLUTION INTRAVENOUS ONCE
Status: COMPLETED | OUTPATIENT
Start: 2023-01-01 | End: 2023-01-01

## 2023-01-01 RX ORDER — BISACODYL 10 MG
10 SUPPOSITORY, RECTAL RECTAL DAILY PRN
Status: DISCONTINUED | OUTPATIENT
Start: 2023-01-01 | End: 2023-01-01 | Stop reason: SDUPTHER

## 2023-01-01 RX ORDER — NOREPINEPHRINE BITARTRATE 0.03 MG/ML
INJECTION, SOLUTION INTRAVENOUS
Status: DISPENSED
Start: 2023-01-01 | End: 2023-01-01

## 2023-01-01 RX ORDER — ACETAMINOPHEN 325 MG/1
650 TABLET ORAL EVERY 4 HOURS PRN
Status: DISCONTINUED | OUTPATIENT
Start: 2023-01-01 | End: 2023-01-01 | Stop reason: HOSPADM

## 2023-01-01 RX ORDER — SODIUM CHLORIDE 9 MG/ML
40 INJECTION, SOLUTION INTRAVENOUS AS NEEDED
Status: DISCONTINUED | OUTPATIENT
Start: 2023-01-01 | End: 2023-01-01 | Stop reason: HOSPADM

## 2023-01-01 RX ORDER — NICOTINE POLACRILEX 4 MG
15 LOZENGE BUCCAL
Status: DISCONTINUED | OUTPATIENT
Start: 2023-01-01 | End: 2023-01-01 | Stop reason: HOSPADM

## 2023-01-01 RX ORDER — POLYETHYLENE GLYCOL 3350 17 G/17G
17 POWDER, FOR SOLUTION ORAL DAILY PRN
Status: DISCONTINUED | OUTPATIENT
Start: 2023-01-01 | End: 2023-01-01 | Stop reason: SDUPTHER

## 2023-01-01 RX ORDER — EPINEPHRINE IN SOD CHLOR,ISO 1 MG/10 ML
SYRINGE (ML) INTRAVENOUS
Status: COMPLETED | OUTPATIENT
Start: 2023-01-01 | End: 2023-01-01

## 2023-01-01 RX ORDER — SODIUM CHLORIDE 0.9 % (FLUSH) 0.9 %
10 SYRINGE (ML) INJECTION EVERY 12 HOURS SCHEDULED
Status: DISCONTINUED | OUTPATIENT
Start: 2023-01-01 | End: 2023-01-01

## 2023-01-01 RX ADMIN — WHITE PETROLATUM 57.7 %-MINERAL OIL 31.9 % EYE OINTMENT 1 APPLICATION: at 11:19

## 2023-01-01 RX ADMIN — AMIODARONE HYDROCHLORIDE 0.5 MG/MIN: 1.8 INJECTION, SOLUTION INTRAVENOUS at 17:22

## 2023-01-01 RX ADMIN — SULFUR HEXAFLUORIDE 2 ML: KIT at 10:57

## 2023-01-01 RX ADMIN — PROPOFOL 5 MCG/KG/MIN: 10 INJECTION, EMULSION INTRAVENOUS at 00:07

## 2023-01-01 RX ADMIN — POTASSIUM CHLORIDE 10 MEQ: 10 INJECTION, SOLUTION INTRAVENOUS at 06:49

## 2023-01-01 RX ADMIN — WHITE PETROLATUM 57.7 %-MINERAL OIL 31.9 % EYE OINTMENT 1 APPLICATION: at 13:25

## 2023-01-01 RX ADMIN — EPINEPHRINE 1 MG: 0.1 INJECTION INTRACARDIAC; INTRAVENOUS at 22:19

## 2023-01-01 RX ADMIN — HEPARIN SODIUM 10.6 UNITS/KG/HR: 10000 INJECTION, SOLUTION INTRAVENOUS at 22:18

## 2023-01-01 RX ADMIN — EPINEPHRINE 1 MG: 0.1 INJECTION INTRACARDIAC; INTRAVENOUS at 22:33

## 2023-01-01 RX ADMIN — MAGNESIUM SULFATE HEPTAHYDRATE 4 G: 4 INJECTION, SOLUTION INTRAVENOUS at 08:58

## 2023-01-01 RX ADMIN — POTASSIUM CHLORIDE 10 MEQ: 10 INJECTION, SOLUTION INTRAVENOUS at 09:44

## 2023-01-01 RX ADMIN — WHITE PETROLATUM 57.7 %-MINERAL OIL 31.9 % EYE OINTMENT 1 APPLICATION: at 18:30

## 2023-01-01 RX ADMIN — ACETAMINOPHEN 1000 MG: 10 INJECTION INTRAVENOUS at 06:51

## 2023-01-01 RX ADMIN — WHITE PETROLATUM 57.7 %-MINERAL OIL 31.9 % EYE OINTMENT 1 APPLICATION: at 22:15

## 2023-01-01 RX ADMIN — HEPARIN SODIUM 8.6 UNITS/KG/HR: 10000 INJECTION, SOLUTION INTRAVENOUS at 17:24

## 2023-01-01 RX ADMIN — Medication 10 ML: at 22:16

## 2023-01-01 RX ADMIN — TICAGRELOR 90 MG: 90 TABLET ORAL at 10:35

## 2023-01-01 RX ADMIN — AMIODARONE HYDROCHLORIDE 0.5 MG/MIN: 1.8 INJECTION, SOLUTION INTRAVENOUS at 01:36

## 2023-01-01 RX ADMIN — AMIODARONE HYDROCHLORIDE 0.5 MG/MIN: 1.8 INJECTION, SOLUTION INTRAVENOUS at 14:08

## 2023-01-01 RX ADMIN — WHITE PETROLATUM 57.7 %-MINERAL OIL 31.9 % EYE OINTMENT: at 08:02

## 2023-01-01 RX ADMIN — SODIUM CHLORIDE 1000 ML: 9 INJECTION, SOLUTION INTRAVENOUS at 22:28

## 2023-01-01 RX ADMIN — AMIODARONE HYDROCHLORIDE 1 MG/MIN: 1.8 INJECTION, SOLUTION INTRAVENOUS at 00:50

## 2023-01-01 RX ADMIN — CEFEPIME 2000 MG: 2 INJECTION, POWDER, FOR SOLUTION INTRAVENOUS at 15:24

## 2023-01-01 RX ADMIN — WHITE PETROLATUM 57.7 %-MINERAL OIL 31.9 % EYE OINTMENT 1 APPLICATION: at 08:19

## 2023-01-01 RX ADMIN — ATORVASTATIN CALCIUM 80 MG: 40 TABLET, FILM COATED ORAL at 20:44

## 2023-01-01 RX ADMIN — VANCOMYCIN HYDROCHLORIDE 1250 MG: 500 INJECTION, POWDER, LYOPHILIZED, FOR SOLUTION INTRAVENOUS at 05:37

## 2023-01-01 RX ADMIN — MORPHINE SULFATE 1 MG: 2 INJECTION, SOLUTION INTRAMUSCULAR; INTRAVENOUS at 10:51

## 2023-01-01 RX ADMIN — SENNOSIDES AND DOCUSATE SODIUM 2 TABLET: 50; 8.6 TABLET ORAL at 08:51

## 2023-01-01 RX ADMIN — SODIUM BICARBONATE 50 MEQ: 84 INJECTION INTRAVENOUS at 22:33

## 2023-01-01 RX ADMIN — AMPICILLIN SODIUM AND SULBACTAM SODIUM 3 G: 2; 1 INJECTION, POWDER, FOR SOLUTION INTRAMUSCULAR; INTRAVENOUS at 00:10

## 2023-01-01 RX ADMIN — ACETAMINOPHEN 650 MG: 325 TABLET ORAL at 05:20

## 2023-01-01 RX ADMIN — WHITE PETROLATUM 57.7 %-MINERAL OIL 31.9 % EYE OINTMENT 1 APPLICATION: at 05:29

## 2023-01-01 RX ADMIN — WHITE PETROLATUM 57.7 %-MINERAL OIL 31.9 % EYE OINTMENT 1 APPLICATION: at 01:53

## 2023-01-01 RX ADMIN — WHITE PETROLATUM 57.7 %-MINERAL OIL 31.9 % EYE OINTMENT 1 APPLICATION: at 05:36

## 2023-01-01 RX ADMIN — EPINEPHRINE 0.02 MCG/KG/MIN: 1 INJECTION INTRAMUSCULAR; INTRAVENOUS; SUBCUTANEOUS at 06:36

## 2023-01-01 RX ADMIN — HEPARIN SODIUM 12 UNITS/KG/HR: 10000 INJECTION, SOLUTION INTRAVENOUS at 10:45

## 2023-01-01 RX ADMIN — INSULIN LISPRO 2 UNITS: 100 INJECTION, SOLUTION INTRAVENOUS; SUBCUTANEOUS at 09:45

## 2023-01-01 RX ADMIN — SODIUM CHLORIDE, POTASSIUM CHLORIDE, SODIUM LACTATE AND CALCIUM CHLORIDE 75 ML/HR: 600; 310; 30; 20 INJECTION, SOLUTION INTRAVENOUS at 22:17

## 2023-01-01 RX ADMIN — WHITE PETROLATUM 57.7 %-MINERAL OIL 31.9 % EYE OINTMENT 1 APPLICATION: at 00:11

## 2023-01-01 RX ADMIN — TICAGRELOR 90 MG: 90 TABLET ORAL at 20:44

## 2023-01-01 RX ADMIN — SENNOSIDES AND DOCUSATE SODIUM 2 TABLET: 50; 8.6 TABLET ORAL at 20:44

## 2023-01-01 RX ADMIN — EPINEPHRINE 0.5 MCG/KG/MIN: 1 INJECTION INTRAMUSCULAR; INTRAVENOUS; SUBCUTANEOUS at 00:24

## 2023-01-01 RX ADMIN — SODIUM CHLORIDE, POTASSIUM CHLORIDE, SODIUM LACTATE AND CALCIUM CHLORIDE 75 ML/HR: 600; 310; 30; 20 INJECTION, SOLUTION INTRAVENOUS at 05:20

## 2023-01-01 RX ADMIN — VANCOMYCIN HYDROCHLORIDE 2250 MG: 500 INJECTION, POWDER, LYOPHILIZED, FOR SOLUTION INTRAVENOUS at 05:21

## 2023-01-01 RX ADMIN — ASPIRIN 300 MG: 300 SUPPOSITORY RECTAL at 09:49

## 2023-01-01 RX ADMIN — WHITE PETROLATUM 57.7 %-MINERAL OIL 31.9 % EYE OINTMENT 1 APPLICATION: at 20:35

## 2023-01-01 RX ADMIN — PANTOPRAZOLE SODIUM 40 MG: 40 INJECTION, POWDER, FOR SOLUTION INTRAVENOUS at 08:52

## 2023-01-01 RX ADMIN — ASPIRIN 81 MG: 81 TABLET, CHEWABLE ORAL at 10:35

## 2023-01-01 RX ADMIN — WHITE PETROLATUM 57.7 %-MINERAL OIL 31.9 % EYE OINTMENT 1 APPLICATION: at 10:35

## 2023-01-01 RX ADMIN — HEPARIN SODIUM 10.6 UNITS/KG/HR: 10000 INJECTION, SOLUTION INTRAVENOUS at 00:29

## 2023-01-01 RX ADMIN — EPINEPHRINE 0.5 MCG/KG/MIN: 1 INJECTION INTRAMUSCULAR; INTRAVENOUS; SUBCUTANEOUS at 02:20

## 2023-01-01 RX ADMIN — POTASSIUM CHLORIDE 10 MEQ: 10 INJECTION, SOLUTION INTRAVENOUS at 00:40

## 2023-01-01 RX ADMIN — CEFEPIME 2000 MG: 2 INJECTION, POWDER, FOR SOLUTION INTRAVENOUS at 05:22

## 2023-01-01 RX ADMIN — INSULIN HUMAN 2 UNITS: 100 INJECTION, SOLUTION PARENTERAL at 17:57

## 2023-01-01 RX ADMIN — EPINEPHRINE 1 MG: 0.1 INJECTION INTRACARDIAC; INTRAVENOUS at 22:24

## 2023-01-01 RX ADMIN — AMPICILLIN SODIUM AND SULBACTAM SODIUM 3 G: 2; 1 INJECTION, POWDER, FOR SOLUTION INTRAMUSCULAR; INTRAVENOUS at 11:19

## 2023-01-01 RX ADMIN — Medication 10 ML: at 09:05

## 2023-01-01 RX ADMIN — CALCIUM GLUCONATE 1000 MG: 20 INJECTION, SOLUTION INTRAVENOUS at 11:41

## 2023-01-01 RX ADMIN — EPINEPHRINE 0.05 MCG/KG/MIN: 1 INJECTION INTRAMUSCULAR; INTRAVENOUS; SUBCUTANEOUS at 13:23

## 2023-01-01 RX ADMIN — WHITE PETROLATUM 57.7 %-MINERAL OIL 31.9 % EYE OINTMENT 1 APPLICATION: at 00:18

## 2023-01-01 RX ADMIN — HEPARIN SODIUM 12 UNITS/KG/HR: 10000 INJECTION, SOLUTION INTRAVENOUS at 21:38

## 2023-01-01 RX ADMIN — WHITE PETROLATUM 57.7 %-MINERAL OIL 31.9 % EYE OINTMENT 1 APPLICATION: at 01:36

## 2023-01-01 RX ADMIN — WHITE PETROLATUM 57.7 %-MINERAL OIL 31.9 % EYE OINTMENT 1 APPLICATION: at 21:39

## 2023-01-01 RX ADMIN — AMIODARONE HYDROCHLORIDE 150 MG: 1.5 INJECTION, SOLUTION INTRAVENOUS at 23:03

## 2023-01-01 RX ADMIN — AMPICILLIN SODIUM AND SULBACTAM SODIUM 3 G: 2; 1 INJECTION, POWDER, FOR SOLUTION INTRAMUSCULAR; INTRAVENOUS at 05:29

## 2023-01-01 RX ADMIN — WHITE PETROLATUM 57.7 %-MINERAL OIL 31.9 % EYE OINTMENT 1 APPLICATION: at 04:04

## 2023-01-01 RX ADMIN — ATORVASTATIN CALCIUM 80 MG: 40 TABLET, FILM COATED ORAL at 22:15

## 2023-01-01 RX ADMIN — WHITE PETROLATUM 57.7 %-MINERAL OIL 31.9 % EYE OINTMENT 1 APPLICATION: at 14:08

## 2023-01-01 RX ADMIN — Medication 10 ML: at 20:45

## 2023-01-01 RX ADMIN — AMIODARONE HYDROCHLORIDE 0.5 MG/MIN: 1.8 INJECTION, SOLUTION INTRAVENOUS at 07:13

## 2023-01-01 RX ADMIN — WHITE PETROLATUM 57.7 %-MINERAL OIL 31.9 % EYE OINTMENT 1 APPLICATION: at 16:54

## 2023-01-01 RX ADMIN — WHITE PETROLATUM 57.7 %-MINERAL OIL 31.9 % EYE OINTMENT 1 APPLICATION: at 11:38

## 2023-01-01 RX ADMIN — SODIUM CHLORIDE, POTASSIUM CHLORIDE, SODIUM LACTATE AND CALCIUM CHLORIDE 75 ML/HR: 600; 310; 30; 20 INJECTION, SOLUTION INTRAVENOUS at 18:04

## 2023-01-01 RX ADMIN — Medication 10 ML: at 08:03

## 2023-01-01 RX ADMIN — WHITE PETROLATUM 57.7 %-MINERAL OIL 31.9 % EYE OINTMENT 1 APPLICATION: at 22:51

## 2023-01-01 RX ADMIN — NOREPINEPHRINE BITARTRATE 0.04 MCG/KG/MIN: 1 INJECTION, SOLUTION, CONCENTRATE INTRAVENOUS at 06:29

## 2023-01-01 RX ADMIN — SODIUM BICARBONATE 50 MEQ: 84 INJECTION INTRAVENOUS at 22:20

## 2023-01-01 RX ADMIN — LORAZEPAM 0.5 MG: 2 INJECTION INTRAMUSCULAR; INTRAVENOUS at 10:50

## 2023-01-01 RX ADMIN — AMPICILLIN SODIUM AND SULBACTAM SODIUM 3 G: 2; 1 INJECTION, POWDER, FOR SOLUTION INTRAMUSCULAR; INTRAVENOUS at 16:54

## 2023-01-01 RX ADMIN — CALCIUM GLUCONATE 2000 MG: 20 INJECTION, SOLUTION INTRAVENOUS at 08:58

## 2023-01-01 RX ADMIN — WHITE PETROLATUM 57.7 %-MINERAL OIL 31.9 % EYE OINTMENT 1 APPLICATION: at 09:04

## 2023-01-01 RX ADMIN — NOREPINEPHRINE BITARTRATE 0.06 MCG/KG/MIN: 1 INJECTION, SOLUTION, CONCENTRATE INTRAVENOUS at 17:22

## 2023-01-01 RX ADMIN — POTASSIUM CHLORIDE 10 MEQ: 10 INJECTION, SOLUTION INTRAVENOUS at 06:24

## 2023-01-01 RX ADMIN — CEFEPIME 2000 MG: 2 INJECTION, POWDER, FOR SOLUTION INTRAVENOUS at 04:04

## 2023-01-01 RX ADMIN — Medication 0.1 MCG/KG/MIN: at 22:41

## 2023-01-01 RX ADMIN — TICAGRELOR 90 MG: 90 TABLET ORAL at 22:15

## 2023-01-01 RX ADMIN — WHITE PETROLATUM 57.7 %-MINERAL OIL 31.9 % EYE OINTMENT 1 APPLICATION: at 09:48

## 2023-01-01 RX ADMIN — PANTOPRAZOLE SODIUM 40 MG: 40 INJECTION, POWDER, FOR SOLUTION INTRAVENOUS at 09:49

## 2023-01-01 RX ADMIN — POTASSIUM CHLORIDE 10 MEQ: 10 INJECTION, SOLUTION INTRAVENOUS at 05:19

## 2023-01-01 RX ADMIN — MAGNESIUM SULFATE HEPTAHYDRATE 2 G: 500 INJECTION, SOLUTION INTRAMUSCULAR; INTRAVENOUS at 22:36

## 2023-01-01 RX ADMIN — EPINEPHRINE 1 MG: 0.1 INJECTION INTRACARDIAC; INTRAVENOUS at 22:46

## 2023-03-17 ENCOUNTER — OFFICE VISIT (OUTPATIENT)
Dept: CARDIOLOGY | Facility: CLINIC | Age: 58
End: 2023-03-17
Payer: MEDICARE

## 2023-03-17 VITALS
SYSTOLIC BLOOD PRESSURE: 121 MMHG | WEIGHT: 218 LBS | HEART RATE: 69 BPM | HEIGHT: 68 IN | DIASTOLIC BLOOD PRESSURE: 77 MMHG | BODY MASS INDEX: 33.04 KG/M2

## 2023-03-17 DIAGNOSIS — Z98.61 CAD S/P PERCUTANEOUS CORONARY ANGIOPLASTY: Primary | ICD-10-CM

## 2023-03-17 DIAGNOSIS — I10 ESSENTIAL HYPERTENSION: ICD-10-CM

## 2023-03-17 DIAGNOSIS — E78.5 HYPERLIPIDEMIA LDL GOAL <70: ICD-10-CM

## 2023-03-17 DIAGNOSIS — I25.10 CAD S/P PERCUTANEOUS CORONARY ANGIOPLASTY: Primary | ICD-10-CM

## 2023-03-17 DIAGNOSIS — I50.22 CHRONIC HFREF (HEART FAILURE WITH REDUCED EJECTION FRACTION): ICD-10-CM

## 2023-03-17 PROBLEM — G47.33 OBSTRUCTIVE SLEEP APNEA SYNDROME: Status: ACTIVE | Noted: 2020-02-24

## 2023-03-17 PROBLEM — R06.09 DOE (DYSPNEA ON EXERTION): Status: RESOLVED | Noted: 2022-07-14 | Resolved: 2023-03-17

## 2023-03-17 PROBLEM — J44.9 CHRONIC OBSTRUCTIVE PULMONARY DISEASE: Status: ACTIVE | Noted: 2023-03-17

## 2023-03-17 PROBLEM — N28.89 RENAL MASS: Status: ACTIVE | Noted: 2018-06-22

## 2023-03-17 PROBLEM — M48.00 SPINAL STENOSIS: Status: ACTIVE | Noted: 2020-02-24

## 2023-03-17 PROBLEM — K21.9 ESOPHAGEAL REFLUX: Status: ACTIVE | Noted: 2023-03-17

## 2023-03-17 PROBLEM — Z86.010 HISTORY OF COLONIC POLYPS: Status: ACTIVE | Noted: 2020-02-24

## 2023-03-17 PROBLEM — M19.90 ARTHRITIS: Status: ACTIVE | Noted: 2023-03-17

## 2023-03-17 PROCEDURE — 1160F RVW MEDS BY RX/DR IN RCRD: CPT | Performed by: NURSE PRACTITIONER

## 2023-03-17 PROCEDURE — 3074F SYST BP LT 130 MM HG: CPT | Performed by: NURSE PRACTITIONER

## 2023-03-17 PROCEDURE — 1159F MED LIST DOCD IN RCRD: CPT | Performed by: NURSE PRACTITIONER

## 2023-03-17 PROCEDURE — 93000 ELECTROCARDIOGRAM COMPLETE: CPT | Performed by: NURSE PRACTITIONER

## 2023-03-17 PROCEDURE — 99214 OFFICE O/P EST MOD 30 MIN: CPT | Performed by: NURSE PRACTITIONER

## 2023-03-17 PROCEDURE — 3078F DIAST BP <80 MM HG: CPT | Performed by: NURSE PRACTITIONER

## 2023-03-17 RX ORDER — LEVOTHYROXINE SODIUM 0.05 MG/1
50 TABLET ORAL DAILY
COMMUNITY

## 2023-03-17 RX ORDER — OMEPRAZOLE 20 MG/1
20 CAPSULE, DELAYED RELEASE ORAL DAILY
COMMUNITY

## 2023-03-17 NOTE — PROGRESS NOTES
"Chief Complaint  Follow-up, Coronary Artery Disease, Hypertension, and Hyperlipidemia    Subjective            History of Present Illness  Anival Arevalo is a 57-year-old male patient who presents to the office today for follow-up.  He has CAD with prior stenting, chronic HFrEF with normalization of ejection fraction, hypertension, and hyperlipidemia.  He reports compliance with all of his medications. He is currently having to adjust his thyroid medication dose with his PCP and is causing some lightheadedness lately. He denies any chest pain, shortness of breath, palpitations, or edema.    PMH  Past Medical History:   Diagnosis Date   • Cancer of kidney (HCC)     L with partial removal 06/22/2018   • Chronic HFrEF (heart failure with reduced ejection fraction)  01/03/2022     Results for orders placed in visit on 01/05/22  Adult Transthoracic Echo Complete w/ Color, Spectral and Contrast if necessary per protocol  Interpretation Summary · Estimated left ventricular EF = 55% · Left ventricular diastolic function was normal.     • COPD (chronic obstructive pulmonary disease) (HCC)    • Coronary artery disease    • Hyperlipidemia    • Hypertension    • Myocardial infarction (HCC)          ALLERGY  Allergies   Allergen Reactions   • Azithromycin Other (See Comments)     Leaves a really bad taste in his mouth - \"rotting flesh\"   • Ezetimibe Rash          SURGICALHX  Past Surgical History:   Procedure Laterality Date   • CARDIAC CATHETERIZATION     • CHOLECYSTECTOMY     • CORONARY STENT PLACEMENT      4    • NEPHRECTOMY PARTIAL      L          SOC  Social History     Socioeconomic History   • Marital status:    Tobacco Use   • Smoking status: Former     Packs/day: 3.00     Types: Cigarettes     Start date: 1968     Quit date: 2013     Years since quitting: 10.2   • Smokeless tobacco: Never   Vaping Use   • Vaping Use: Never used   Substance and Sexual Activity   • Alcohol use: No   • Drug use: Never   • Sexual " "activity: Defer         FAMHX  Family History   Problem Relation Age of Onset   • Cancer Mother    • Cancer Father           MEDSIGONLY  Current Outpatient Medications on File Prior to Visit   Medication Sig   • albuterol (PROVENTIL) (5 MG/ML) 0.5% nebulizer solution Inhale 2.5 mg.   • aspirin 81 MG EC tablet Take 1 tablet by mouth.   • carvedilol (COREG) 3.125 MG tablet Take 1 tablet by mouth 2 (Two) Times a Day With Meals.   • cetirizine (zyrTEC) 10 MG tablet Daily.   • Cholecalciferol 125 MCG (5000 UT) tablet Daily.   • fluticasone (FLONASE) 50 MCG/ACT nasal spray 2 sprays into the nostril(s) as directed by provider.   • hydroCHLOROthiazide (MICROZIDE) 12.5 MG capsule Take 1 capsule by mouth Daily.   • indapamide (LOZOL) 1.25 MG tablet Take 1 tablet by mouth Daily.   • levothyroxine (SYNTHROID, LEVOTHROID) 50 MCG tablet Take 1 tablet by mouth Daily.   • lisinopril (PRINIVIL,ZESTRIL) 5 MG tablet Take 1 tablet by mouth Daily.   • montelukast (SINGULAIR) 10 MG tablet Take 1 tablet by mouth Every Night.   • nitroglycerin (NITROSTAT) 0.4 MG SL tablet Place 1 tablet under the tongue Every 5 (Five) Minutes As Needed.   • omeprazole (priLOSEC) 20 MG capsule Take 1 capsule by mouth Daily.   • rosuvastatin (CRESTOR) 40 MG tablet Take 1 tablet by mouth Daily.     No current facility-administered medications on file prior to visit.       Objective   /77   Pulse 69   Ht 172.7 cm (68\")   Wt 98.9 kg (218 lb)   BMI 33.15 kg/m²       Physical Exam  HENT:      Head: Normocephalic.   Neck:      Vascular: No carotid bruit.   Cardiovascular:      Rate and Rhythm: Normal rate and regular rhythm.      Pulses: Normal pulses.      Heart sounds: Normal heart sounds. No murmur heard.  Pulmonary:      Effort: Pulmonary effort is normal.      Breath sounds: Normal breath sounds.   Musculoskeletal:      Cervical back: Neck supple.      Right lower leg: No edema.      Left lower leg: No edema.   Skin:     General: Skin is dry. "   Neurological:      Mental Status: He is alert and oriented to person, place, and time.   Psychiatric:         Behavior: Behavior normal.       ECG 12 Lead    Date/Time: 3/17/2023 8:26 AM  Performed by: Kristie Blunt APRN  Authorized by: Kristie Blunt APRN   Comparison: compared with previous ECG from 1/5/2022  Similar to previous ECG  Rhythm: sinus rhythm  Rate: normal  BPM: 67  Conduction: non-specific intraventricular conduction delay  ST Segments: ST segments normal  T Waves: T waves normal  QRS axis: normal  Other: no other findings    Clinical impression: abnormal EKG          Result Review :   The following data was reviewed by: RUI Ward on 03/17/2023:  No results found for: PROBNP     No results found for: TSH   No results found for: FREET4   No results found for: DDIMERQUANT  No results found for: MG   No results found for: DIGOXIN   No results found for: TROPONINT        Results for orders placed in visit on 01/05/22    Adult Transthoracic Echo Complete w/ Color, Spectral and Contrast if necessary per protocol    Interpretation Summary  · Estimated left ventricular EF = 55%  · Left ventricular diastolic function was normal.      [id number] Score: 3        Assessment and Plan    Diagnoses and all orders for this visit:    1. CAD S/P percutaneous coronary angioplasty (Primary)  He denies any anginal symptoms, continue aspirin 81 mg daily.    2. Chronic HFrEF (heart failure with reduced ejection fraction)   Symptomatically stable and euvolemic on exam today, continue carvedilol 3.125 mg twice daily, hydrochlorothiazide 12.5 mg daily, and lisinopril 5 mg daily.    3. Essential hypertension  Currently controlled without adverse effects from medication, continue current doses of carvedilol, hydrochlorothiazide, and lisinopril.  Low-sodium diet discussed.    4. Hyperlipidemia LDL goal <70  Last lipid panel was 2/11/2022 with LDL of 89 which is outside of his goal range, obtain new  fasting lipid and hepatic function panel to assess effectiveness of current rosuvastatin dose.  He reports that he had labs done with his PCP on 2/10/2023, placed labs will be requested and if his fasting lipid panel is on there then the future order will not be needed.  -     Lipid Panel; Future  -     Hepatic Function Panel; Future              Follow Up   Return in about 6 months (around 9/17/2023) for Follow up with Dr Coppola.    Patient was given instructions and counseling regarding his condition or for health maintenance advice. Please see specific information pulled into the AVS if appropriate.     Anival Arevalo  reports that he quit smoking about 10 years ago. His smoking use included cigarettes. He started smoking about 55 years ago. He smoked an average of 3 packs per day. He has never used smokeless tobacco.           Kristie Blunt, RUI  03/17/23  08:36 EDT    Dictated Utilizing Dragon Dictation

## 2023-03-24 ENCOUNTER — OFFICE VISIT (OUTPATIENT)
Dept: SURGERY | Facility: CLINIC | Age: 58
End: 2023-03-24
Payer: MEDICARE

## 2023-03-24 ENCOUNTER — PREP FOR SURGERY (OUTPATIENT)
Dept: OTHER | Facility: HOSPITAL | Age: 58
End: 2023-03-24
Payer: MEDICARE

## 2023-03-24 VITALS — WEIGHT: 217 LBS | HEIGHT: 68 IN | BODY MASS INDEX: 32.89 KG/M2 | RESPIRATION RATE: 16 BRPM

## 2023-03-24 DIAGNOSIS — R19.8 GI SYMPTOMS: Primary | ICD-10-CM

## 2023-03-24 PROCEDURE — 1159F MED LIST DOCD IN RCRD: CPT | Performed by: SURGERY

## 2023-03-24 PROCEDURE — 99203 OFFICE O/P NEW LOW 30 MIN: CPT | Performed by: SURGERY

## 2023-03-24 PROCEDURE — 1160F RVW MEDS BY RX/DR IN RCRD: CPT | Performed by: SURGERY

## 2023-03-24 NOTE — H&P (VIEW-ONLY)
Chief Complaint:  Abdominal Pain    Primary Care Provider: Beto Lama MD    Referring Provider: Eun Carrillo APRN    History of Present Illness  Anival Arevalo is a 57 y.o. male referred by RUI Elizondo to have an EGD for GI symptoms.  For about the past month the patient has been having what he describes as burning in his stomach.  He points to his abdomen below the level of his umbilicus as the location of the burning occurs.  He says once it starts it works his way up his abdomen.  He started taking omeprazole 20 mg nightly about a month ago.  He thought it helped at first now he does not think it is helping.  Patient does not drink alcohol.  He does not use any tobacco products.  No dysphagia.  No nausea or vomiting.  Patient has occasional bloating along with the burning.  He has occasional diarrhea but this is not new and started after he had his gallbladder removed.    Allergies: Azithromycin and Ezetimibe    Outpatient Medications Marked as Taking for the 3/24/23 encounter (Office Visit) with Khai Gallegos MD   Medication Sig Dispense Refill   • albuterol (PROVENTIL) (5 MG/ML) 0.5% nebulizer solution Inhale 2.5 mg.     • aspirin 81 MG EC tablet Take 1 tablet by mouth.     • carvedilol (COREG) 3.125 MG tablet Take 1 tablet by mouth 2 (Two) Times a Day With Meals.     • cetirizine (zyrTEC) 10 MG tablet Daily.     • Cholecalciferol 125 MCG (5000 UT) tablet Daily.     • fluticasone (FLONASE) 50 MCG/ACT nasal spray 2 sprays into the nostril(s) as directed by provider.     • hydroCHLOROthiazide (MICROZIDE) 12.5 MG capsule Take 1 capsule by mouth Daily.     • indapamide (LOZOL) 1.25 MG tablet Take 1 tablet by mouth Daily.     • levothyroxine (SYNTHROID, LEVOTHROID) 50 MCG tablet Take 1 tablet by mouth Daily.     • lisinopril (PRINIVIL,ZESTRIL) 5 MG tablet Take 1 tablet by mouth Daily.     • montelukast (SINGULAIR) 10 MG tablet Take 1 tablet by mouth Every Night.     • nitroglycerin (NITROSTAT)  "0.4 MG SL tablet Place 1 tablet under the tongue Every 5 (Five) Minutes As Needed.     • omeprazole (priLOSEC) 20 MG capsule Take 1 capsule by mouth Daily.     • rosuvastatin (CRESTOR) 40 MG tablet Take 1 tablet by mouth Daily.         Past Medical History:   • Cancer of kidney (HCC)    L with partial removal 06/22/2018   • Chronic HFrEF (heart failure with reduced ejection fraction)      Results for orders placed in visit on 01/05/22  Adult Transthoracic Echo Complete w/ Color, Spectral and Contrast if necessary per protocol  Interpretation Summary · Estimated left ventricular EF = 55% · Left ventricular diastolic function was normal.     • Colon polyp   • COPD (chronic obstructive pulmonary disease) (HCC)   • Coronary artery disease   • Hyperlipidemia   • Hypertension   • Myocardial infarction (HCC)        Past Surgical History:   • CARDIAC CATHETERIZATION   • CHOLECYSTECTOMY   • CORONARY STENT PLACEMENT    4    • EYE SURGERY   • NEPHRECTOMY PARTIAL    L       Family History:   Family History   Problem Relation Age of Onset   • Cancer Mother    • COPD Mother    • Diabetes Mother    • Cancer Father    • COPD Father         Social History:  Social History     Tobacco Use   • Smoking status: Former     Packs/day: 3.00     Years: 43.00     Pack years: 129.00     Types: Cigarettes     Start date: 1/1/1968     Quit date: 1/1/2013     Years since quitting: 10.2   • Smokeless tobacco: Never   Substance Use Topics   • Alcohol use: No       Objective     Vital Signs:  Resp 16   Ht 172.7 cm (68\")   Wt 98.4 kg (217 lb)   BMI 32.99 kg/m²   • Constitutional: alert, no acute distress, reliable historian  • HENT:  NCAT, no visible deformities or lesions  • Eyes:  sclerae clear, conjunctivae clear, EOMI  • Neck:  normal appearance, no masses, trachea midline  • Respiratory:  breathing not labored, respiratory effort appears normal  • Cardiovascular:  heart regular rate  • Abdomen:  nondistended    • Skin and subcutaneous " tissue:  no visible concerning rashes or lesions, no jaundice  • Musculoskeletal: moving all extremities symmetrically and purposefully  • Neurologic:  no obvious motor or sensory deficits, normal gait, able to stand without difficulty, cerebellar function without any obvious abnormalities, alert & oriented x 3, speech clear  • Psychiatric:  judgment and insight intact, mood normal, affect appropriate, cooperative      Assessment:  Diagnoses and all orders for this visit:    1. GI symptoms (Primary)      Plan:  Esophagogastroduodenoscopy    Discussion: Indications, options, risks, benefits, and expected outcomes of planned surgery were discussed with the patient and he agrees to proceed.    Khai Gallegos MD  03/24/2023    Electronically signed by Khai Gallegos MD, 03/24/23, 2:17 PM EDT.

## 2023-03-27 NOTE — PRE-PROCEDURE INSTRUCTIONS
Arrival-time of 1200.    Must have a  for transportation home post-procedure.    Nothing to eat or drink after midnight.    Hold all medications the morning of the procedure, instead bring all prescribed medications on that morning.    Instructed to take nebulizer treatment and bring inhalers to the hospital on the day of the procedure.    Resume medications as prescribed post-procedure.     Patient verbalized understanding of instructions.      Kym Franklin RN

## 2023-03-29 ENCOUNTER — HOSPITAL ENCOUNTER (OUTPATIENT)
Facility: HOSPITAL | Age: 58
Setting detail: HOSPITAL OUTPATIENT SURGERY
Discharge: HOME | End: 2023-03-29
Attending: SURGERY | Admitting: SURGERY
Payer: MEDICARE

## 2023-03-29 ENCOUNTER — ANESTHESIA (OUTPATIENT)
Dept: GASTROENTEROLOGY | Facility: HOSPITAL | Age: 58
End: 2023-03-29
Payer: MEDICARE

## 2023-03-29 ENCOUNTER — ANESTHESIA EVENT (OUTPATIENT)
Dept: GASTROENTEROLOGY | Facility: HOSPITAL | Age: 58
End: 2023-03-29
Payer: MEDICARE

## 2023-03-29 VITALS
HEART RATE: 72 BPM | RESPIRATION RATE: 16 BRPM | TEMPERATURE: 97.4 F | WEIGHT: 216.49 LBS | DIASTOLIC BLOOD PRESSURE: 96 MMHG | BODY MASS INDEX: 32.92 KG/M2 | SYSTOLIC BLOOD PRESSURE: 111 MMHG | OXYGEN SATURATION: 96 %

## 2023-03-29 DIAGNOSIS — R19.8 GI SYMPTOMS: ICD-10-CM

## 2023-03-29 PROCEDURE — 88305 TISSUE EXAM BY PATHOLOGIST: CPT | Performed by: SURGERY

## 2023-03-29 PROCEDURE — 25010000002 PROPOFOL 10 MG/ML EMULSION: Performed by: NURSE ANESTHETIST, CERTIFIED REGISTERED

## 2023-03-29 RX ORDER — PROPOFOL 10 MG/ML
VIAL (ML) INTRAVENOUS AS NEEDED
Status: DISCONTINUED | OUTPATIENT
Start: 2023-03-29 | End: 2023-03-29 | Stop reason: SURG

## 2023-03-29 RX ORDER — LIDOCAINE HYDROCHLORIDE 20 MG/ML
INJECTION, SOLUTION EPIDURAL; INFILTRATION; INTRACAUDAL; PERINEURAL AS NEEDED
Status: DISCONTINUED | OUTPATIENT
Start: 2023-03-29 | End: 2023-03-29 | Stop reason: SURG

## 2023-03-29 RX ORDER — SODIUM CHLORIDE, SODIUM LACTATE, POTASSIUM CHLORIDE, CALCIUM CHLORIDE 600; 310; 30; 20 MG/100ML; MG/100ML; MG/100ML; MG/100ML
30 INJECTION, SOLUTION INTRAVENOUS CONTINUOUS
Status: DISCONTINUED | OUTPATIENT
Start: 2023-03-29 | End: 2023-03-29 | Stop reason: HOSPADM

## 2023-03-29 RX ADMIN — PROPOFOL 50 MG: 10 INJECTION, EMULSION INTRAVENOUS at 13:20

## 2023-03-29 RX ADMIN — PROPOFOL 50 MG: 10 INJECTION, EMULSION INTRAVENOUS at 13:26

## 2023-03-29 RX ADMIN — SODIUM CHLORIDE, POTASSIUM CHLORIDE, SODIUM LACTATE AND CALCIUM CHLORIDE 30 ML/HR: 600; 310; 30; 20 INJECTION, SOLUTION INTRAVENOUS at 13:10

## 2023-03-29 RX ADMIN — LIDOCAINE HYDROCHLORIDE 50 MG: 20 INJECTION, SOLUTION EPIDURAL; INFILTRATION; INTRACAUDAL; PERINEURAL at 13:18

## 2023-03-29 RX ADMIN — PROPOFOL 100 MG: 10 INJECTION, EMULSION INTRAVENOUS at 13:18

## 2023-03-29 NOTE — ANESTHESIA PREPROCEDURE EVALUATION
Anesthesia Evaluation     Patient summary reviewed and Nursing notes reviewed                Airway   Mallampati: I  TM distance: >3 FB  Neck ROM: full  No difficulty expected  Dental      Pulmonary - normal exam    breath sounds clear to auscultation  (+) COPD, sleep apnea,   Cardiovascular - normal exam    Rhythm: regular  Rate: normal    (+) hypertension, past MI , CAD, hyperlipidemia,       Neuro/Psych- negative ROS  GI/Hepatic/Renal/Endo    (+) obesity,  GERD,  renal disease,     Musculoskeletal     Abdominal    Substance History - negative use     OB/GYN negative ob/gyn ROS         Other   arthritis,    history of cancer                    Anesthesia Plan    ASA 4     general     intravenous induction     Anesthetic plan, risks, benefits, and alternatives have been provided, discussed and informed consent has been obtained with: patient.        CODE STATUS:

## 2023-03-29 NOTE — ANESTHESIA POSTPROCEDURE EVALUATION
Patient: Anival Arevalo    Procedure Summary     Date: 03/29/23 Room / Location: [facility] ENDOSCOPY 5 / [facility] ENDOSCOPY    Anesthesia Start: 1316 Anesthesia Stop: 1335    Procedure: ESOPHAGOGASTRODUODENOSCOPY WITH BIOPSIES Diagnosis:       GI symptoms      (GI symptoms [R19.8])    Surgeons: Khai Gallegos MD Provider: Marin Perez MD    Anesthesia Type: general ASA Status: 4          Anesthesia Type: general    Vitals  Vitals Value Taken Time   /96 03/29/23 1344   Temp 36.3 °C (97.4 °F) 03/29/23 1335   Pulse 66 03/29/23 1346   Resp 16 03/29/23 1344   SpO2 97 % 03/29/23 1346   Vitals shown include unvalidated device data.        Post Anesthesia Care and Evaluation    Patient location during evaluation: bedside  Patient participation: complete - patient participated  Level of consciousness: awake  Pain management: adequate    Airway patency: patent  PONV Status: none  Cardiovascular status: acceptable  Respiratory status: acceptable  Hydration status: acceptable    Comments: An Anesthesiologist personally participated in the most demanding procedures (including induction and emergence if applicable) in the anesthesia plan, monitored the course of anesthesia administration at frequent intervals and remained physically present and available for immediate diagnosis and treatment of emergencies.

## 2023-03-31 LAB
CYTO UR: NORMAL
LAB AP CASE REPORT: NORMAL
LAB AP CLINICAL INFORMATION: NORMAL
PATH REPORT.FINAL DX SPEC: NORMAL
PATH REPORT.GROSS SPEC: NORMAL

## 2023-05-30 ENCOUNTER — TELEPHONE (OUTPATIENT)
Dept: SURGERY | Facility: CLINIC | Age: 58
End: 2023-05-30

## 2023-05-30 NOTE — TELEPHONE ENCOUNTER
Caller: ERI THOMPSON     Relationship: SELF    Best call back number: [phone]    What is the best time to reach you: ANY    Who are you requesting to speak with (clinical staff, provider,  specific staff member): NON CLINICAL    Do you know the name of the person who called: NA    What was the call regarding: RECALL LETTER. PT SAID HE HAD CSCPE WITH DR SOLORIO 1.5 YEARS AGO. I COULD NOT SEE IN CHART. I SEE A CSCOPE CONSULT ON 3-9-20. PT SAID HE SHOULD BE ON A 3 YEAR OPTION    Is it okay if the provider responds through MyChart: PLEASE CALL PT      PLEASE CALL PT.      Attempted to Warm Transfer but, unable to reach the practice:

## 2023-09-19 PROBLEM — I46.9 CARDIAC ARREST: Status: ACTIVE | Noted: 2023-01-01

## 2023-09-19 NOTE — PAYOR COMM NOTE
"Eri Thompson (58 y.o. Male)       Date of Birth   1965    Social Security Number       Address   [address] [address]    Home Phone   [phone]    MRN   [id number]       Druze   Unknown    Marital Status                               Admission Date   9/18/23    Admission Type   Emergency    Admitting Provider   Luther Rangel DO    Attending Provider   Jarred Rich DO    Department, Room/Bed   [facility] CORONARY CARE UNIT, C05/1       Discharge Date       Discharge Disposition       Discharge Destination                                 Attending Provider: Jarred Rich DO    Allergies: Azithromycin, Ezetimibe    Isolation: None   Infection: None   Code Status: CPR    Ht: 175.3 cm (69\")   Wt: 99.9 kg (220 lb 3.8 oz)    Admission Cmt: None   Principal Problem: Cardiac arrest [I46.9]                   Active Insurance as of 9/18/2023       Primary Coverage       Payor Plan Insurance Group Employer/Plan Group    HUMANA MEDICARE REPLACEMENT HUMANA MEDICARE REPLACEMENT [id number]       Payor Plan Address Payor Plan Phone Number Payor Plan Fax Number Effective Dates    [address] [address] [phone]  1/1/2022 - None Entered    [facility] [id number]         Subscriber Name Subscriber Birth Date Member ID       ERI THOMPSON 1965 [id number]                     Emergency Contacts        (Rel.) Home Phone Work Phone Mobile Phone    Yue Thompson (Spouse) [phone] -- --             Myocardial Infarction RRG Inpatient Care by Deja Gutierrez, RN       Indications Met: Reviewed on 9/19/2023 by Deja Gutierrez RN       Created Using Review Status Review Entered   [facility] for Case Management Primary Completed 9/19/2023 1001       Created By   Deja Gutierrez, RN       Criteria Set Name - Subset   Myocardial Infarction RRG Inpatient Care      Criteria Review   Myocardial Infarction RRG Inpatient Care     Overall Determination: Indications " Met     Criteria:  [×] Admission is indicated for  1 or more  of the following :      [×] Acute myocardial infarction (MI) [G] [H] (not in context of cardiac procedure within last 48 hours), as indicated by  ALL  of the following :          [×] Elevated cardiac troponin level, [I] [J] [K] [L] as indicated by  1 or more  of the following :              [×] New or presumed new elevation of cardiac troponin level (ie, elevation clearly not due to chronic condition)                  2023 10:01 AM                      -- 2023 10:01 AM by Deja Gutierrez RN --                          [facility]: 111*/224*          [×] Myocardial injury due to acute ischemia, as indicated by  1 or more  of the following :              [×] New or presumed new ECG changes consistent with ischemia (eg, ST-segment change, left bundle branch block)                  2023 10:01 AM                      -- 2023 10:01 AM by Deja Gutierrez RN --                          EKG showed possible ST elevation in inferior leads     Notes:  -- 2023 10:01 AM by Deja Gutierrez RN --      23 - Care Day #1                  -CC: Out of hospital cardiac arrest                  -HR: 119, BP: 69/42, RR: 29, O2: 76% on RA, T: 103.3(core)      -ABGs: 7.033*/90.3*/60.7/23.5, Lactate: 10.18* - ambu bag      -[facility]: 111*/224*, Lactate: 10.1*, WBC: 26.12, Bun/Cr: 29/2.01, eGFR: 37.3, CO2: 17.7, Anion Gap: 23.3, ALT/AST: 118/120, K: 3.4, Ca: 8.1, Ma.6      -Blood and sputum cx pending      -Meds in ED: Epi IV, Epinephrine gtt, Amio gtt, Heparin gtt, Propofol gtt, Na Bicarb, NS 1L bolus, Mag Sulfate IV, IV KCl       -Meds ordered/continued: Epinephrine gtt, Levophed gtt, Amio gtt, LR @ 75 mL/hr, IV Cefepime - PTD, IV Vanc - PTD, IV KCl, Toprol-XL, Brinlinta, SSI      -Orders: Vent, NPO, TTE, EEG, neuro checks q1hr, daily weights, strict I/Os, continuous cardiac monitoring, cardiac rehab main                  -Dr. Rangel 23: … brought to the  emergency department for evaluation of an outside, witnessed cardiac arrest… atient was at home with family, complaining of chest pain throughout the day, and suddenly collapsed. CPR was started. Patient was intubated via EMS in route. Patient was given 4 rounds of epinephrine via EMS. EMS reports atrial fibrillation, was subsequently defibrillated, and then had persistent PEA. Upon arrival to the emergency department, CPR was in process. ROSC was achieved in the emergency department. Patient was started on epinephrine drip for blood pressure and cardiogenic shock…       Assessment:      · Cardiac arrest      · Acute, inferior MI      · Acute hypoxic respiratory failure      · Cardiogenic shock      · Acute kidney injury      · Severe metabolic acidosis      · Hypokalemia      · Lactic acidosis                  -CXR 9/19/23: 1. Increased right-sided infiltrates are suggested. The findings may represent worsening infectious multifocal pneumonia or aspiration pneumonia. Pulmonary edema is possible. 2. Probably no significant interval change in the endotracheal (ET) tube position. 3. No significant interval change is suggested in the nasogastric (NG) tube position since the prior KUB from 0125 hours on the same day       -CT Head 9/19/23: There is possible early anoxic brain injury. No acute intracranial hemorrhage. No definite focal acute infarct. No midline shift or acute intracranial herniation syndrome. No acute skull fracture. Please see above comments for further detail.             Ventricular Arrhythmias RRG Inpatient Care by Deja Gutierrez RN       Indications Met: Reviewed on 9/19/2023 by Deja Gutierrez RN       Created Using Review Status Review Entered   [facility] Completed 9/19/2023 1009       Created By   Deja Gutierrez RN       Criteria Set Name - Subset   Ventricular Arrhythmias RRG Inpatient Care      Criteria Review   Ventricular Arrhythmias RRG Inpatient Care     Overall  Determination: Indications Met     Criteria:  [×] Admission is indicated for  1 or more  of the following :      [×] Resuscitated or aborted sudden cardiac arrest (eg, ventricular fibrillation, asystole, pulseless ventricular tachycardia) in patient without implantable cardioverter-defibrillator          2023 10:09 AM              -- 2023 10:09 AM by Deja Gutierrez RN --                  Dr. Lindquist: Patient presents emergency department as a CODE BLUE. On arrival CPR was in progress. Patient received several doses of epi and bicarb. In the ER we were able to regain pulses. Patient did have a run of V. tach and he was defibrillated.      [×] Respiratory distress (eg, dyspnea, Tachypnea)          2023 10:09 AM              -- 2023 10:09 AM by Deja Gutierrez RN --                  Intubated      [×] Hypoxemia          2023 10:09 AM              -- 2023 10:09 AM by Deja Gutierrez RN --                  Intubated      [×] Suspected cardiac ischemia (eg, elevated Troponin) as cause or consequence of arrhythmia          2023 10:09 AM              -- 2023 10:09 AM by Deja Gutierrez RN --                  [facility]: 111*/224* KG showed possible ST elevation in inferior leads. Patient did have a run of V. tach and he was defibrillated.      [×] Continuous ECG monitoring needed (eg, drug initiation or adjustment requiring monitoring for more than 24 hours, ventricular tachycardia persists or recurs) [B]     Notes:  -- 2023 10:09 AM by Deja Gutierrez RN --      23 - Care Day #1                  -CC: Out of hospital cardiac arrest                  -HR: 119, BP: 69/42, RR: 29, O2: 76% on RA, T: 103.3(core)      -ABGs: 7.033*/90.3*/60.7/23.5, Lactate: 10.18* - ambu bag      -[facility]: 111*/224*, Lactate: 10.1*, WBC: 26.12, Bun/Cr: 29/2.01, eGFR: 37.3, CO2: 17.7, Anion Gap: 23.3, ALT/AST: 118/120, K: 3.4, Ca: 8.1, Ma.6      -Blood and sputum cx pending      -Meds in ED: Epi IV,  Epinephrine gtt, Amio gtt, Heparin gtt, Propofol gtt, Na Bicarb, NS 1L bolus, Mag Sulfate IV, IV KCl       -Meds ordered/continued: Epinephrine gtt, Levophed gtt, Amio gtt, LR @ 75 mL/hr, IV Cefepime - PTD, IV Vanc - PTD, IV KCl, Toprol-XL, Brinlinta, SSI      -Orders: NPO, Vent, TTE, EEG, neuro checks q1hr, daily weights, strict I/Os, continuous cardiac monitoring, cardiac rehab eval                              -Dr. Lindquist 23: Patient presents emergency department as a CODE BLUE. On arrival CPR was in progress. Patient received several doses of epi and bicarb. In the ER we were able to regain pulses. Patient did have a run of V. tach and he was defibrillated. He was given amiodarone. We did lose pulses several times in the ER. EKG showed possible ST elevation in inferior leads. I discussed patient with cardiologist on-call Dr. Doherty who states patient being down over 45 minutes and unstable recommend medical management and adding heparin once more stable. Patient was also started on epi infusion. Discussed patient with critical care physician Dr. Herrmann who will see him in the ICU. Patient does have an elevated troponin of 111. Lactic acid also elevated to 10. Patient was given IV fluids. He is currently sedated. Discussed patient with hospitalist and he will be admitted to the ICU for further care...                                  History & Physical        ClaireLuther DO at 23 0021           [facility]  [facility]   Patient Name: Anival Arevalo  : 1965  MRN: [id number]  Primary Care Physician:  Beto Lama MD  Date of admission: 2023    Subjective  Subjective     Chief Complaint: Outside hospital arrest    HPI:    Anival Arevalo is a 58 y.o. male brought to the emergency department for evaluation of an outside, witnessed cardiac arrest.  Per EMS, patient was at home with family, complaining of chest pain throughout the day, and  suddenly collapsed.  CPR was started.  Patient was intubated via EMS in route.  Patient was given 4 rounds of epinephrine via EMS.  EMS reports atrial fibrillation, was subsequently defibrillated, and then had persistent PEA.  Upon arrival to the emergency department, CPR was in process.  ROSC was achieved in the emergency department.  Patient was started on epinephrine drip for blood pressure and cardiogenic shock.      Personal History     Past Medical History:  Past Medical History:   Diagnosis Date    Cancer of kidney     L with partial removal 06/22/2018    Chronic HFrEF (heart failure with reduced ejection fraction)  01/03/2022     Results for orders placed in visit on 01/05/22  Adult Transthoracic Echo Complete w/ Color, Spectral and Contrast if necessary per protocol  Interpretation Summary · Estimated left ventricular EF = 55% · Left ventricular diastolic function was normal.      Colon polyp     COPD (chronic obstructive pulmonary disease)     Coronary artery disease     Hyperlipidemia     Hypertension     Myocardial infarction          Past Surgical History:  Past Surgical History:   Procedure Laterality Date    CARDIAC CATHETERIZATION      CHOLECYSTECTOMY      COLONOSCOPY      CORONARY STENT PLACEMENT      4     ENDOSCOPY N/A 3/29/2023    Procedure: ESOPHAGOGASTRODUODENOSCOPY WITH BIOPSIES;  Surgeon: Khai Gallegos MD;  Location: [facility] ENDOSCOPY;  Service: General;  Laterality: N/A;  SAME AS PREOP    EYE SURGERY  1976    NEPHRECTOMY PARTIAL      L         Family History:   Family History   Problem Relation Age of Onset    Cancer Mother     COPD Mother     Diabetes Mother     Cancer Father     COPD Father          Social History:   Social History     Socioeconomic History    Marital status:    Tobacco Use    Smoking status: Former     Packs/day: 3.00     Years: 43.00     Pack years: 129.00     Types: Cigarettes     Start date: 1/1/1968     Quit date: 1/1/2013     Years since quitting: 10.7     "Smokeless tobacco: Never   Vaping Use    Vaping Use: Never used   Substance and Sexual Activity    Alcohol use: No    Drug use: Never    Sexual activity: Not Currently         Home Medications:  Cholecalciferol, albuterol, aspirin, carvedilol, cetirizine, fluticasone, hydroCHLOROthiazide, indapamide, levothyroxine, lisinopril, montelukast, nitroglycerin, omeprazole, and rosuvastatin    Allergies:  Allergies   Allergen Reactions    Azithromycin Other (See Comments)     Leaves a really bad taste in his mouth - \"rotting flesh\"    Ezetimibe Rash       Review of Systems   All systems were reviewed and negative except for: Cardiac arrest, chest pain    Objective  Objective     Vitals:   Temp:  [97.5 °F (36.4 °C)] 97.5 °F (36.4 °C)  Heart Rate:  [] 94  Resp:  [15-18] 18  BP: ()/() 160/107  FiO2 (%):  [40 %-100 %] 40 %    Physical Exam    Constitutional: Intubated, sedated, no acute distress   Eyes: Pupils equal, sclerae anicteric, no conjunctival injection   HENT: NCAT, mucous membranes moist   Neck: Supple, no thyromegaly, no lymphadenopathy, trachea midline   Respiratory: Clear to auscultation bilaterally, nonlabored respirations    Cardiovascular: RRR, no murmurs, rubs, or gallops, palpable pedal pulses bilaterally   Gastrointestinal: Positive bowel sounds, soft, nontender, nondistended   Musculoskeletal: No bilateral ankle edema, no clubbing or cyanosis to extremities   Psychiatric: Unable to be assessed secondary to patient's intubated and sedated status.   Neurologic: Unable to be assessed secondary to patient's intubated and sedated status.   Skin: No rashes     Result Review   Result Review:  I have personally reviewed the results from the time of this admission to 9/19/2023 00:21 EDT and agree with these findings:  [x]  Laboratory  []  Microbiology  [x]  Radiology  [x]  EKG/Telemetry   [x]  Cardiology/Vascular   []  Pathology  [x]  Old records  []  Other:      Assessment & Plan  Assessment / Plan "     Assessment/Plan:   Cardiac arrest-cardiology consulted in the ED, recommended against heart catheterization lab at this time, recommend stabilization, medical management with heparinization.  Continue amiodarone.  Acute, inferior MI-plan as above  Acute hypoxic respiratory failure-status post ET tube intubation.  Continue ventilation.  Sedation.  Cardiogenic shock-vasopressors.  Titrate as needed for MAP greater than 65.  Acute kidney injury-likely prerenal from cardiogenic shock and cardiac arrest.  Continue to monitor.  Gentle IV fluid hydration.  No improvement next 24 to 48 hours, consider nephrology consultation.  Severe metabolic acidosis-serial ABGs, consider bicarb pushes versus bicarb drip.  Hypokalemia-replete, recheck  Lactic acidosis-likely clinically significant, secondary to hypoxic respiratory failure, cardiogenic shock.        DVT prophylaxis:  Medical DVT prophylaxis orders are present.    CODE STATUS: Full code    Discussed with patient's family at bedside, patient has a poor prognosis given the length of time he was unresponsive.  Patient's family wished to keep the patient full code, attempt medical management, and get family in to say goodbye if necessary.  Patient's family's questions and concerns addressed at bedside.  Verbalized understanding and in agreement treatment plan.    Admission Status:  I believe this patient meets inpatient status.    Electronically signed by Luther Rangel DO, 09/19/23, 12:21 AM EDT.               Electronically signed by Luther Rangel DO at 09/19/23 0036          Emergency Department Notes        Peg Jj, RN at 09/19/23 0215          Call to pharmacy from this RN for more epinephrine; none in pyxis.    Electronically signed by Peg Jj, RN at 09/19/23 0221       Len Lindquist MD at 09/18/23 6003          Time: 11:10 PM EDT  Date of encounter:  9/18/2023  Independent Historian/Clinical History and Information was obtained by:  "  Patient    History is limited by: N/A    Chief Complaint: Cardiac arrest      History of Present Illness:  Patient is a 58 y.o. year old male who presents to the emergency department As a CODE BLUE.  Per report EMS states that patient was at home with family he was complaining of chest pain and then collapsed.  CPR was started.  EMS intubated patient.  He received 4 rounds of epinephrine by EMS.  They also state that he was in V-fib he was defibrillated after that he was in PEA persistently.  On arrival CPR was in process.  Family arrived and states that he does have a history of coronary artery disease and does have stents.    HPI    Patient Care Team  Primary Care Provider: Beto Lama MD    Past Medical History:     Allergies   Allergen Reactions    Azithromycin Other (See Comments)     Leaves a really bad taste in his mouth - \"rotting flesh\"    Ezetimibe Rash     Past Medical History:   Diagnosis Date    Cancer of kidney     L with partial removal 06/22/2018    Chronic HFrEF (heart failure with reduced ejection fraction)  01/03/2022     Results for orders placed in visit on 01/05/22  Adult Transthoracic Echo Complete w/ Color, Spectral and Contrast if necessary per protocol  Interpretation Summary · Estimated left ventricular EF = 55% · Left ventricular diastolic function was normal.      Colon polyp     COPD (chronic obstructive pulmonary disease)     Coronary artery disease     Hyperlipidemia     Hypertension     Myocardial infarction      Past Surgical History:   Procedure Laterality Date    CARDIAC CATHETERIZATION      CHOLECYSTECTOMY      COLONOSCOPY      CORONARY STENT PLACEMENT      4     ENDOSCOPY N/A 3/29/2023    Procedure: ESOPHAGOGASTRODUODENOSCOPY WITH BIOPSIES;  Surgeon: Khai Gallegos MD;  Location: [facility] ENDOSCOPY;  Service: General;  Laterality: N/A;  SAME AS PREOP    EYE SURGERY  1976    NEPHRECTOMY PARTIAL      L     Family History   Problem Relation Age of Onset    Cancer Mother  "    COPD Mother     Diabetes Mother     Cancer Father     COPD Father        Home Medications:  Prior to Admission medications    Medication Sig Start Date End Date Taking? Authorizing Provider   albuterol (PROVENTIL) (5 MG/ML) 0.5% nebulizer solution Inhale 2.5 mg.    Nurse Lyn Lilly RN   aspirin 81 MG EC tablet Take 1 tablet by mouth.    Nurse Lyn Lilly RN   carvedilol (COREG) 3.125 MG tablet Take 1 tablet by mouth 2 (Two) Times a Day With Meals.    Nurse Lyn Lilly RN   cetirizine (zyrTEC) 10 MG tablet Daily.    ProviderMaurilio MD   Cholecalciferol 125 MCG (5000 UT) tablet Daily.    ProviderMaurilio MD   fluticasone (FLONASE) 50 MCG/ACT nasal spray 2 sprays into the nostril(s) as directed by provider.    Nurse Lyn Lilly RN   hydroCHLOROthiazide (MICROZIDE) 12.5 MG capsule Take 1 capsule by mouth Daily.    ProviderMaurilio MD   indapamide (LOZOL) 1.25 MG tablet Take 1 tablet by mouth Daily.    Nurse Lyn Lilly RN   levothyroxine (SYNTHROID, LEVOTHROID) 50 MCG tablet Take 1 tablet by mouth Daily.    ProviderMaurilio MD   lisinopril (PRINIVIL,ZESTRIL) 5 MG tablet Take 1 tablet by mouth Daily.    Nurse Lyn Lilly RN   montelukast (SINGULAIR) 10 MG tablet Take 1 tablet by mouth Every Night.    Nurse Lyn Lilly RN   nitroglycerin (NITROSTAT) 0.4 MG SL tablet Place 1 tablet under the tongue Every 5 (Five) Minutes As Needed.    Nurse Lyn Lilly RN   omeprazole (priLOSEC) 20 MG capsule Take 1 capsule by mouth Daily.    ProviderMaurilio MD   rosuvastatin (CRESTOR) 40 MG tablet Take 1 tablet by mouth Daily.    Nurse Lyn Lilly RN        Social History:   Social History     Tobacco Use    Smoking status: Former     Packs/day: 3.00     Years: 43.00     Pack years: 129.00     Types: Cigarettes     Start date: 1/1/1968     Quit date: 1/1/2013     Years since quitting: 10.7    Smokeless tobacco: Never   Vaping Use    Vaping Use: Never used    Substance Use Topics    Alcohol use: No    Drug use: Never         Review of Systems:  Review of Systems   Unable to perform ROS: Intubated      Physical Exam:  BP (!) 160/107   Pulse 94   Temp 97.5 °F (36.4 °C) (Bladder)   Resp 18   Wt 113 kg (248 lb 3.8 oz)   SpO2 97%   BMI 37.74 kg/m²     Physical Exam   Appearance: Ongoing CPR.  Unresponsive.  No visible trauma.    Eyes: Pupils fixed and dilated.    Neck: Normal inspection.    CVS: Chest compressions performed.  No spontaneous pulse.    Respiratory: Ventilated.  No spontaneous respirations.    Abdomen: Soft.    Skin: Cyanosis.  Pallor.    Extremities: No lower extremity edema.    Neuro: Unresponsive.  No motor response to pain.              Procedures:  Procedures      Medical Decision Making:      Comorbidities that affect care:    HLD, Congestive Heart Failure, COPD, Coronary Artery Disease, Hypertension    External Notes reviewed:    Previous Clinic Note: Patient was seen by surgery on 3/24/2023 for abdominal pain with plan for EGD.      The following orders were placed and all results were independently analyzed by me:  Orders Placed This Encounter   Procedures    Blood Culture - Blood,    Blood Culture - Blood,    XR Chest 1 View    CT Head Without Contrast    XR Abdomen KUB    ABG with Co-Ox and Electrolytes    Magnesium    Comprehensive Metabolic Panel    CBC Auto Differential    aPTT    CK    Protime-INR    Urinalysis With Culture If Indicated - Urine, Clean Catch    Lactic Acid, Plasma    BNP    High Sensitivity Troponin T    Manual Differential    High Sensitivity Troponin T 2Hr    STAT Lactic Acid, Reflex    aPTT    CBC Auto Differential    Notify Provider Platelet Count Less Than 88170    Notify Provider if PTT Not in Therapeutic Range After 24 Hours    Stop Infusion & Notify Provider if Bleeding Occurs    RN To Release aPTT Order 6 Hours After Heparin Bolus & 6 Hours After Any Heparin Rate Change    After 2 Consecutive Therapeutic aPTTs,  Obtain aPTT Daily.  If a Rate Adjustment is Necessary, Resume Every 6 Hour aPTT Draws    Target Arousal Level RASS -1 to -2    Inpatient Hospitalist Consult    IP General Consult (Use specialty-specific consult if known)    ECG 12 Lead Chest Pain    ECG 12 Lead Chest Pain    Insert peripheral IV    CBC & Differential    Extra Tubes    Gold Top - SST    CBC & Differential       Medications Given in the Emergency Department:  Medications   sodium chloride 0.9 % flush 10 mL (has no administration in time range)   magnesium sulfate 2 GM/50ML infusion  - ADS Override Pull (has no administration in time range)   EPINEPHrine 5 mg in 250 mL NS infusion (has no administration in time range)   amiodarone 150 mg in 100 mL D5W (loading dose) (has no administration in time range)     Followed by   amiodarone 360 mg in 200 mL D5W infusion (has no administration in time range)     Followed by   amiodarone 360 mg in 200 mL D5W infusion (has no administration in time range)   potassium chloride 10 mEq in 100 mL IVPB (has no administration in time range)   propofol (DIPRIVAN) infusion 10 mg/mL 100 mL (10 mcg/kg/min × 113 kg Intravenous Rate/Dose Change 9/19/23 0023)   heparin bolus from bag 5,000 Units (has no administration in time range)   heparin 88852 units/250 mL (100 units/mL) in 0.45 % NaCl infusion (has no administration in time range)   heparin bolus from bag 4,000 Units (has no administration in time range)   heparin bolus from bag 2,000 Units (has no administration in time range)   EPINEPHrine (ADRENALIN) injection (1 mg Intravenous Given 9/18/23 2246)   sodium bicarbonate injection 8.4% (50 mEq Intravenous Given 9/18/23 2233)   sodium chloride 0.9 % bolus 1,000 mL (1,000 mL Intravenous New Bag 9/18/23 2228)   magnesium sulfate injection (2 g Intravenous Given 9/18/23 2236)        ED Course:    ED Course as of 09/19/23 0023   Mon Sep 18, 2023   2327 ECG 12 Lead Chest Pain  Wide complex QRS with rate of 60.  ST elevation in  lead III and aVF.  EKG interpreted by me. [LD]   2327 ECG 12 Lead Chest Pain  Ectopic atrial tachycardia rate of 125.  Interventricular conduction delay.  ST elevation in lead III and aVF.  EKG interpreted by me [LD]   2328 EKG at 9/18/2023 at 20 3:10 PM showed sinus tachycardia with rate of 104.  Prolonged MT interval.  Intraventricular conduction delay.  ST elevation in lead III.  Normal QTc.  EKG interpreted by me. [LD]      ED Course User Index  [LD] Len Lindquist MD       Labs:    Lab Results (last 24 hours)       Procedure Component Value Units Date/Time    ABG with Co-Ox and Electrolytes [[id number]]  (Abnormal) Collected: 09/18/23 2222    Specimen: Arterial Blood Updated: 09/18/23 2307     pH, Arterial 7.033 pH units      pCO2, Arterial 90.3 mm Hg      pO2, Arterial 60.7 mm Hg      HCO3, Arterial 23.5 mmol/L      Base Excess, Arterial -9.5 mmol/L      O2 Saturation, Arterial 77.1 %      Hemoglobin, Blood Gas 15.0 g/dL      Carboxyhemoglobin 0.5 %      Methemoglobin 0.10 %      Oxyhemoglobin 76.6 %      FHHB 22.8 %      Kvng's Test Positive     Note patient being bagged by ambu bag     Site Arterial: right brachial     Modality Ambu bag     FIO2 100 %      Flow Rate 15 lpm      Sodium, Arterial 145.4 mmol/L      Potassium, Arterial 2.71 mmol/L      Ionized Calcium, Arterial 1.12 mmol/L      Chloride, Arterial 98 mmol/L      Glucose, Arterial 289 mg/dL      Lactate, Arterial 10.18 mmol/L      PO2/FIO2 61    Magnesium [[id number]]  (Abnormal) Collected: 09/18/23 2302    Specimen: Blood Updated: 09/18/23 2337     Magnesium 2.9 mg/dL     Comprehensive Metabolic Panel [[id number]]  (Abnormal) Collected: 09/18/23 2302    Specimen: Blood Updated: 09/18/23 2337     Glucose 303 mg/dL      BUN 26 mg/dL      Creatinine 1.90 mg/dL      Sodium 141 mmol/L      Potassium 2.7 mmol/L      Comment: Slight hemolysis detected by analyzer. Results may be affected.        Chloride 98 mmol/L      CO2 19.8 mmol/L      Calcium  8.9 mg/dL      Total Protein 6.3 g/dL      Albumin 3.9 g/dL      ALT (SGPT) 85 U/L      AST (SGOT) 74 U/L      Comment: Slight hemolysis detected by analyzer. Results may be affected.        Alkaline Phosphatase 76 U/L      Total Bilirubin 0.3 mg/dL      Globulin 2.4 gm/dL      A/G Ratio 1.6 g/dL      BUN/Creatinine Ratio 13.7     Anion Gap 23.2 mmol/L      eGFR 39.9 mL/min/1.73     Narrative:      GFR Normal >60  Chronic Kidney Disease <60  Kidney Failure <15      CBC & Differential [[id number]]  (Abnormal) Collected: 09/18/23 2302    Specimen: Blood Updated: 09/18/23 2331    Narrative:      The following orders were created for panel order CBC & Differential.  Procedure                               Abnormality         Status                     ---------                               -----------         ------                     CBC Auto Differential[[id number]]        Abnormal            Final result               Scan Slide[[id number]]                                                                    Please view results for these tests on the individual orders.    CBC Auto Differential [[id number]]  (Abnormal) Collected: 09/18/23 2302    Specimen: Blood Updated: 09/18/23 2331     WBC 22.07 10*3/mm3      RBC 4.85 10*6/mm3      Hemoglobin 14.9 g/dL      Hematocrit 46.0 %      MCV 94.8 fL      MCH 30.7 pg      MCHC 32.4 g/dL      RDW 12.4 %      RDW-SD 43.1 fl      MPV 9.1 fL      Platelets 178 10*3/mm3     aPTT [[id number]]  (Normal) Collected: 09/18/23 2302    Specimen: Blood Updated: 09/18/23 2325     PTT 32.3 seconds     CK [[id number]]  (Normal) Collected: 09/18/23 2302    Specimen: Blood Updated: 09/18/23 2336     Creatine Kinase 187 U/L     Protime-INR [[id number]]  (Abnormal) Collected: 09/18/23 2302    Specimen: Blood Updated: 09/18/23 2325     Protime 15.0 Seconds      INR 1.17    Narrative:      Suggested Therapeutic Ranges For Oral Anticoagulant Therapy:  Level of Therapy                      INR Target  Range  Standard Dose                            2.0-3.0  High Dose                                2.5-3.5  Patients not receiving anticoagulant  Therapy Normal Range                     0.86-1.15    Lactic Acid, Plasma [[id number]]  (Abnormal) Collected: 09/18/23 2302    Specimen: Blood Updated: 09/18/23 2348     Lactate 10.1 mmol/L     BNP [[id number]]  (Normal) Collected: 09/18/23 2302    Specimen: Blood Updated: 09/18/23 2333     proBNP 200.4 pg/mL     Narrative:      Among patients with dyspnea, NT-proBNP is highly sensitive for the detection of acute congestive heart failure. In addition NT-proBNP of <300 pg/ml effectively rules out acute congestive heart failure with 99% negative predictive value.      High Sensitivity Troponin T [[id number]]  (Abnormal) Collected: 09/18/23 2302    Specimen: Blood Updated: 09/18/23 2348     HS Troponin T 111 ng/L     Narrative:      High Sensitive Troponin T Reference Range:  <10.0 ng/L- Negative Female for AMI  <15.0 ng/L- Negative Male for AMI  >=10 - Abnormal Female indicating possible myocardial injury.  >=15 - Abnormal Male indicating possible myocardial injury.   Clinicians would have to utilize clinical acumen, EKG, Troponin, and serial changes to determine if it is an Acute Myocardial Infarction or myocardial injury due to an underlying chronic condition.         Manual Differential [[id number]]  (Abnormal) Collected: 09/18/23 2302    Specimen: Blood Updated: 09/18/23 2331     Neutrophil % 67.0 %      Lymphocyte % 16.0 %      Monocyte % 7.0 %      Eosinophil % 1.0 %      Basophil % 1.0 %      Bands %  1.0 %      Atypical Lymphocyte % 7.0 %      Neutrophils Absolute 15.01 10*3/mm3      Lymphocytes Absolute 5.08 10*3/mm3      Monocytes Absolute 1.54 10*3/mm3      Eosinophils Absolute 0.22 10*3/mm3      Basophils Absolute 0.22 10*3/mm3      Elliptocytes Slight/1+     Microcytes Slight/1+     Ovalocytes Slight/1+     Smudge Cells Slight/1+     Platelet Estimate Adequate      Large Platelets Slight/1+             Imaging:    No Radiology Exams Resulted Within Past 24 Hours      Differential Diagnosis and Discussion:    Cardiac Arrest: Differential diagnosis includes but is not limited to ventricular tachycardia, ventricular fibrillation, asystole, PEA, respiratory arrest due to hypoxia or metabolic abnormalities.    All labs were reviewed and interpreted by me.  All X-rays impressions were independently interpreted by me.  EKG was interpreted by me.  CT scan radiology impression was interpreted by me.    MDM  Number of Diagnoses or Management Options  Diagnosis management comments: Patient presents emergency department as a CODE BLUE.  On arrival CPR was in progress.  Patient received several doses of epi and bicarb.  In the ER we were able to regain pulses.  Patient did have a run of V. tach and he was defibrillated.  He was given amiodarone.  We did lose pulses several times in the ER.  EKG showed possible ST elevation in inferior leads. I discussed patient with cardiologist on-call Dr. Doherty who states patient being down over 45 minutes and unstable recommend medical management and adding heparin once more stable.  Patient was also started on epi infusion. Discussed patient with critical care physician Dr. Herrmann who will see him in the ICU.  Patient does have an elevated troponin of 111.  Lactic acid also elevated to 10.  Patient was given IV fluids.  He is currently sedated.  Discussed patient with hospitalist and he will be admitted to the ICU for further care.       Amount and/or Complexity of Data Reviewed  Clinical lab tests: reviewed  Tests in the radiology section of CPT®: reviewed  Review and summarize past medical records: yes  Independent visualization of images, tracings, or specimens: yes    Risk of Complications, Morbidity, and/or Mortality  Presenting problems: high  Management options: high         Critical Care Note: Total Critical Care time of 60 minutes. Total  critical care time documented does not include time spent on separately billed procedures for services of nurses or physician assistants. I personally saw and examined the patient. I have reviewed all diagnostic interpretations and treatment plans as written. I was present for the key portions of any procedures performed and the inclusive time noted in any critical care statement. Critical care time includes patient management by me, time spent at the patients bedside,  time to review lab and imaging results, discussing patient care, documentation in the medical record, and time spent with family or caregiver.    Patient Care Considerations:          Consultants/Shared Management Plan:    Hospitalist: I have discussed the case with Dr Rangel who agrees to accept the patient for admission.  Consultant: I have discussed the case with Dr Herrmann who agrees to consult on the patient.  Consultant: I have discussed the case with Dr Matos who states recommend medical management and heparin    Social Determinants of Health:    Patient has presented with family members who are responsible, reliable and will ensure follow up care.      Disposition and Care Coordination:    Admit:   Through independent evaluation of the patient's history, physical, and imperical data, the patient meets criteria for observation/admission to the hospital.        Final diagnoses:   Cardiac arrest        ED Disposition       ED Disposition   Decision to Admit    Condition   --    Comment   --               This medical record created using voice recognition software.             Len Lindquist MD  09/19/23 0023      Electronically signed by Len Lindquist MD at 09/19/23 0023       Physician Progress Notes (last 24 hours)  Notes from 09/18/23 1031 through 09/19/23 1031   No notes of this type exist for this encounter.       Consult Notes (last 24 hours)  Notes from 09/18/23 1031 through 09/19/23 1031   No notes of this type exist for this  encounter.

## 2023-09-19 NOTE — CONSULTS
Pulmonary / Critical Care Consult Note      Patient Name: Anival Arevalo  : 1965  MRN: [id number]  Primary Care Physician:  Beto Lama MD  Referring Physician: Jarred Rich DO  Date of admission: 2023    Subjective   Subjective     Reason for Consult/ Chief Complaint: Cardiac arrest     HPI:  Anival Arevalo is a 58 y.o. male with history of CAD, hypertension, hyperlipidemia, hypothyroidism presented to the ED for out of hospital cardiac arrest.  Per family patient started to experience chest pain and then collapsed.  CPR was initiated, when EMS arrived patient did receive defibrillation for ventricular fibrillation.  Per ED documentation patient achieved ROSC after 13 minutes at hospital.  It is unknown how long patient was down without pulse prior to arrival.  CT scan of head did exhibit early signs concerning for anoxic brain injury. EKG showed concerns for acute inferior MI. Cardiology was consulted from the emergency room and recommended heparin drip, and deferred cardiac cath at this time.  Lab findings showed lactic acid 10.1, 1.9, potassium 2.7, CO2 19, troponin 224.  The patient was initiated on epinephrine drip, amiodarone drip, and heparin he was admitted to the intensive care unit for critical care management.    Exam patient is off sedation, not optimally responsive at this time, does have disconjugate gaze.  He is currently on AC/VC, does have cough and gag present, is initiating breathing over the ventilator, currently febrile with a temperature of 103.     Review of Systems  Unable to obtain due to pt status.       Personal History     Past Medical History:   Diagnosis Date    Cancer of kidney     L with partial removal 2018    Chronic HFrEF (heart failure with reduced ejection fraction)  2022     Results for orders placed in visit on 22  Adult Transthoracic Echo Complete w/ Color, Spectral and Contrast if necessary per protocol  Interpretation Summary ·  "Estimated left ventricular EF = 55% · Left ventricular diastolic function was normal.      Colon polyp     COPD (chronic obstructive pulmonary disease)     Coronary artery disease     Hyperlipidemia     Hypertension     Myocardial infarction        Past Surgical History:   Procedure Laterality Date    CARDIAC CATHETERIZATION      CHOLECYSTECTOMY      COLONOSCOPY      CORONARY STENT PLACEMENT      4     CYST REMOVAL Left     cyst aspiration of the knee    ENDOSCOPY N/A 03/29/2023    Procedure: ESOPHAGOGASTRODUODENOSCOPY WITH BIOPSIES;  Surgeon: Khai Gallegos MD;  Location: [facility] ENDOSCOPY;  Service: General;  Laterality: N/A;  SAME AS PREOP    EYE SURGERY  1976    NEPHRECTOMY PARTIAL      L       Family History: family history includes COPD in his father and mother; Cancer in his father and mother; Diabetes in his mother. Otherwise pertinent FHx was reviewed and not pertinent to current issue.    Social History:  reports that he quit smoking about 10 years ago. His smoking use included cigarettes. He started smoking about 55 years ago. He has a 129.00 pack-year smoking history. He has never used smokeless tobacco. He reports that he does not drink alcohol and does not use drugs.    Home Medications:  Cholecalciferol, albuterol, aspirin, carvedilol, cetirizine, fluticasone, hydroCHLOROthiazide, indapamide, levothyroxine, lisinopril, montelukast, nitroglycerin, omeprazole, and rosuvastatin    Allergies:  Allergies   Allergen Reactions    Azithromycin Other (See Comments)     Leaves a really bad taste in his mouth - \"rotting flesh\"    Ezetimibe Rash       Objective    Objective     Vitals:   Temp:  [97.5 °F (36.4 °C)-103.3 °F (39.6 °C)] 103.3 °F (39.6 °C)  Heart Rate:  [] 87  Resp:  [15-29] 29  BP: ()/() 109/79  FiO2 (%):  [40 %-100 %] 60 %    Physical Exam:  Vital Signs Reviewed   General: Critically ill-appearing male, lying in bed   HEENT:  PERRL, EOMI. disconjugate gaze noted  Neck:  No JVD, no " thyromegaly  Lymph: no axillary, cervical, supraclavicular lymphadenopathy noted bilaterally  Chest: Coarse rhonchi noted auscultation bilaterally, no work of breathing noted on mechanical ventilator  CV: RRR, no M/G/R, pulses 2+  Abd:  Soft, NT, ND, +BS  EXT:  no clubbing, no cyanosis, no edema  Neuro:  A&Ox0, does not follow commands, does not respond to verbal or pain stimuli, CN grossly intact, no focal deficits.  Skin: No rashes or lesions noted    Result Review    Result Review:  I have personally reviewed the results from the time of this admission to 9/19/2023 09:44 EDT and agree with these findings:  [x]  Laboratory  [x]  Microbiology  [x]  Radiology  [x]  EKG/Telemetry   []  Cardiology/Vascular   []  Pathology  []  Old records  []  Other:  Most notable findings include:       Lab 09/19/23  0728 09/19/23  0539 09/19/23  0511 09/19/23  0351 09/18/23  2302 09/18/23  2222   WBC  --  26.12*  --   --  22.07*  --    HEMOGLOBIN  --  15.7  --   --  14.9  --    HEMATOCRIT  --  50.9  --   --  46.0  --    PLATELETS  --  286  --   --  178  --    SODIUM  --   --  142  --  141  --    SODIUM, ARTERIAL 139.7  --   --  140.1  --  145.4   POTASSIUM  --   --  3.4*  --  2.7*  --    CHLORIDE  --   --  101  --  98  --    CO2  --   --  17.7*  --  19.8*  --    BUN  --   --  29*  --  26*  --    CREATININE  --   --  2.01*  --  1.90*  --    GLUCOSE  --   --  293*  --  303*  --    GLUCOSE, ARTERIAL 150*  --   --  317*  --  289*   CALCIUM  --   --  8.1*  --  8.9  --    PHOSPHORUS  --   --  5.7*  --   --   --    TOTAL PROTEIN  --   --  6.7  --  6.3  --    ALBUMIN  --   --  4.1  --  3.9  --    GLOBULIN  --   --  2.6  --  2.4  --      CT Head Without Contrast    Result Date: 9/19/2023   There is possible early anoxic brain injury.  No acute intracranial hemorrhage.  No definite focal acute infarct.  No midline shift or acute intracranial herniation syndrome.  No acute skull fracture.  Please see above comments for further detail.     Please note that portions of this note were completed with a voice recognition program.  MIGUEL ANGEL HARGROVE JR, MD       Electronically Signed and Approved By: MIGUEL ANGEL HARGROVE JR, MD on 9/19/2023 at 1:40             XR Chest 1 View    Result Date: 9/19/2023     1. Increased right-sided infiltrates are suggested.  The findings may represent worsening infectious multifocal pneumonia or aspiration pneumonia.  Pulmonary edema is possible.   2. Probably no significant interval change in the endotracheal (ET) tube position.   3. No significant interval change is suggested in the nasogastric (NG) tube position since the prior KUB from 0125 hours on the same day     Please note that portions of this note were completed with a voice recognition program.  MIGUEL ANGEL HARGROVE JR, MD       Electronically Signed and Approved By: MIGUEL ANGEL HARGROVE JR, MD on 9/19/2023 at 5:45              XR Chest 1 View    Result Date: 9/19/2023    1. Interval intubation.  The endotracheal (ET) tube is in satisfactory position.  2. The distal nasogastric (NG) tip is at the expected gastroesophageal (GE) junction.  3. Diffuse bilateral infiltrates are seen, which may represent pneumonia or pulmonary edema.  4. There are low lung volumes.  5. There may be a small right pleural effusion.  Minimal, if any, left pleural effusion is suggested.  6. No pneumothorax is seen.  7. Mild cardiomegaly is suspected. 8. Please see above comments for further detail.     Please note that portions of this note were completed with a voice recognition program.  MIGUEL ANGEL HARGROVE JR, MD       Electronically Signed and Approved By: MIGUEL ANGEL HARGROVE JR, MD on 9/19/2023 at 1:13              XR Abdomen KUB    Result Date: 9/19/2023    The distal tip of the nasogastric (NG) tube is in the expected gastric lumen, as discussed.     Please note that portions of this note were completed with a voice recognition program.  MIGUEL ANGEL HARGROVE JR, MD       Electronically Signed and Approved By: MIGUEL ANGEL EARLY  BEENA TA MD on 9/19/2023 at 2:19              XR Abdomen KUB    Result Date: 9/19/2023    The distal tip of the nasogastric (NG) tube is in the expected lower thoracic esophagus.     Please note that portions of this note were completed with a voice recognition program.  MIGUEL ANGEL HARGROVE JR, MD       Electronically Signed and Approved By: MIGUEL ANGEL HARGROVE JR, MD on 9/19/2023 at 2:17              XR Abdomen KUB    Result Date: 9/19/2023    The distal tip of the nasogastric (NG) tube is at the expected gastroesophageal (GE) junction.     Please note that portions of this note were completed with a voice recognition program.  MIGUEL ANGEL HARGROVE JR, MD       Electronically Signed and Approved By: MIGUEL ANGEL HARGROVE JR, MD on 9/19/2023 at 1:14                 Assessment & Plan   Assessment / Plan     Active Hospital Problems:  Active Hospital Problems    Diagnosis     **Cardiac arrest      Impression:  Out of hospital cardiac arrest  Ventricular fibrillation  Cardiogenic shock  Acute coronary syndrome  Acute Inferior MI  Altered  mental status  Concern for anoxic brain injury  Acute hypoxic/hypercarbic respiratory failure requiring mechanical ventilation  Acute kidney injury, likely prerenal secondary to hypotension/shock  Coronary artery disease  Lactic acidosis, clinically significant  Urinary tract infection    Plan:  Neuro  -Patient currently off sedation, does have abnormal neurological exam, GCS <3  -CT of head concerning for acute anoxic brain injury  -Continue Tylenol for fever, attempt to maintain normothermia  -Arctic sun currently in place target normothermia  -Rapid EEG  -Once clinically stable, would consider getting MRI    Pulmonary  -Continue mechanical ventilation ACVC 18/400/5 +, wean FiO2 as tolerated  -Unable to tolerate SBT due to critical state at this time  -Chest x-ray consistent with possible aspiration  -Aspiration precautions  -Send sputum culture  -Recheck ABG with electrolytes at noon    Cardio  -Out of  hospital V-fib/cardiac arrest/cardiogenic shock  -Echocardiogram pending  -Cardiology consulted, appreciate assistance  -Continue amiodarone  -Continue heparin drip  -Continue epinephrine and levo to maintain MAP at 65  -Check cardiac index  -Continue Brilinta, aspirin and statin  -Hold beta-blocker at this time  -trend lactic acid    Renal  -Trend renal function monitor replace electrolytes  -Potassiujm replaced  -Replace calcium x1  -Avoid nephrotoxins/hypotension    GI  -NG tube to low wall suction  -Would hold off on tube feeds at this time due to high vasopressor requirement  -Continue Protonix 40 mg IV daily  -Follow LFTs    Endocrine  -Glucose currently in target range, monitor  -TSH ok    Heme  -Hemoglobin stable 15, platelets 286    ID  -Chest x-ray consistent with right-sided infiltrate possible aspiration  -Urinalysis also concerning for UTI  -Continue broad-spectrum antibiotics with vancomycin and cefepime  -Blood culture in process  -Add culture to urine in lab, send sputum culture, check MRSA PCR    PUP: protonix  DVT: heparin gtt    DVT prophylaxis:  Medical DVT prophylaxis orders are present.     Code Status and Medical Interventions:   Ordered at: 09/19/23 0034     Level Of Support Discussed With:    Patient     Code Status (Patient has no pulse and is not breathing):    CPR (Attempt to Resuscitate)     Medical Interventions (Patient has pulse or is breathing):    Full Support      IEmily APRN, spent 20 minutes critical care time in accordance to [email or web address] billing.  Electronically signed by RUI Dorsey, 09/19/23, 9:44 AM EDT.    Patient is critically ill with out-of-hospital arrest, encephalopathy, shock, lactic acidosis. 45 minutes of critical care time was spent managing this patient, excluding procedures. Of this time, Dr. Hoa RAMIREZ, spent 25 minutes and Emily Ta NP spent 20 minutes in accordance with split shared billing. This included personally  reviewing all pertinent labs, imaging, microbiology and documentation. Also discussing the case with the patient and any available family, the admitting physician and any available ancillary staff.   Electronically signed by Hoa Herrmann MD, 09/19/23, 10:35 AM EDT.

## 2023-09-19 NOTE — ED PROVIDER NOTES
"Time: 11:10 PM EDT  Date of encounter:  9/18/2023  Independent Historian/Clinical History and Information was obtained by:   Patient    History is limited by: N/A    Chief Complaint: Cardiac arrest      History of Present Illness:  Patient is a 58 y.o. year old male who presents to the emergency department As a CODE BLUE.  Per report EMS states that patient was at home with family he was complaining of chest pain and then collapsed.  CPR was started.  EMS intubated patient.  He received 4 rounds of epinephrine by EMS.  They also state that he was in V-fib he was defibrillated after that he was in PEA persistently.  On arrival CPR was in process.  Family arrived and states that he does have a history of coronary artery disease and does have stents.    HPI    Patient Care Team  Primary Care Provider: Beto Lama MD    Past Medical History:     Allergies   Allergen Reactions    Azithromycin Other (See Comments)     Leaves a really bad taste in his mouth - \"rotting flesh\"    Ezetimibe Rash     Past Medical History:   Diagnosis Date    Cancer of kidney     L with partial removal 06/22/2018    Chronic HFrEF (heart failure with reduced ejection fraction)  01/03/2022     Results for orders placed in visit on 01/05/22  Adult Transthoracic Echo Complete w/ Color, Spectral and Contrast if necessary per protocol  Interpretation Summary · Estimated left ventricular EF = 55% · Left ventricular diastolic function was normal.      Colon polyp     COPD (chronic obstructive pulmonary disease)     Coronary artery disease     Hyperlipidemia     Hypertension     Myocardial infarction      Past Surgical History:   Procedure Laterality Date    CARDIAC CATHETERIZATION      CHOLECYSTECTOMY      COLONOSCOPY      CORONARY STENT PLACEMENT      4     ENDOSCOPY N/A 3/29/2023    Procedure: ESOPHAGOGASTRODUODENOSCOPY WITH BIOPSIES;  Surgeon: Khai Gallegos MD;  Location: [facility] ENDOSCOPY;  Service: General;  Laterality: N/A;  SAME AS " PREOP    EYE SURGERY  1976    NEPHRECTOMY PARTIAL      L     Family History   Problem Relation Age of Onset    Cancer Mother     COPD Mother     Diabetes Mother     Cancer Father     COPD Father        Home Medications:  Prior to Admission medications    Medication Sig Start Date End Date Taking? Authorizing Provider   albuterol (PROVENTIL) (5 MG/ML) 0.5% nebulizer solution Inhale 2.5 mg.    Nurse Lyn Lilly RN   aspirin 81 MG EC tablet Take 1 tablet by mouth.    Nurse Lyn Lilly RN   carvedilol (COREG) 3.125 MG tablet Take 1 tablet by mouth 2 (Two) Times a Day With Meals.    Nurse Lyn Lilly RN   cetirizine (zyrTEC) 10 MG tablet Daily.    Provider, MD Maurilio   Cholecalciferol 125 MCG (5000 UT) tablet Daily.    Provider, MD Maurilio   fluticasone (FLONASE) 50 MCG/ACT nasal spray 2 sprays into the nostril(s) as directed by provider.    Nurse Lyn Lilly RN   hydroCHLOROthiazide (MICROZIDE) 12.5 MG capsule Take 1 capsule by mouth Daily.    Provider, MD Maurilio   indapamide (LOZOL) 1.25 MG tablet Take 1 tablet by mouth Daily.    Nurse Lyn Lilly RN   levothyroxine (SYNTHROID, LEVOTHROID) 50 MCG tablet Take 1 tablet by mouth Daily.    Provider, MD Maurilio   lisinopril (PRINIVIL,ZESTRIL) 5 MG tablet Take 1 tablet by mouth Daily.    Nurse Lyn Lilly RN   montelukast (SINGULAIR) 10 MG tablet Take 1 tablet by mouth Every Night.    Nurse Lyn Lilly RN   nitroglycerin (NITROSTAT) 0.4 MG SL tablet Place 1 tablet under the tongue Every 5 (Five) Minutes As Needed.    Nurse Lyn Lilly RN   omeprazole (priLOSEC) 20 MG capsule Take 1 capsule by mouth Daily.    ProviderMaurilio MD   rosuvastatin (CRESTOR) 40 MG tablet Take 1 tablet by mouth Daily.    Nurse Lyn Lilly RN        Social History:   Social History     Tobacco Use    Smoking status: Former     Packs/day: 3.00     Years: 43.00     Pack years: 129.00     Types: Cigarettes     Start date: 1/1/1968      Quit date: 1/1/2013     Years since quitting: 10.7    Smokeless tobacco: Never   Vaping Use    Vaping Use: Never used   Substance Use Topics    Alcohol use: No    Drug use: Never         Review of Systems:  Review of Systems   Unable to perform ROS: Intubated      Physical Exam:  BP (!) 160/107   Pulse 94   Temp 97.5 °F (36.4 °C) (Bladder)   Resp 18   Wt 113 kg (248 lb 3.8 oz)   SpO2 97%   BMI 37.74 kg/m²     Physical Exam   Appearance: Ongoing CPR.  Unresponsive.  No visible trauma.    Eyes: Pupils fixed and dilated.    Neck: Normal inspection.    CVS: Chest compressions performed.  No spontaneous pulse.    Respiratory: Ventilated.  No spontaneous respirations.    Abdomen: Soft.    Skin: Cyanosis.  Pallor.    Extremities: No lower extremity edema.    Neuro: Unresponsive.  No motor response to pain.              Procedures:  Procedures      Medical Decision Making:      Comorbidities that affect care:    HLD, Congestive Heart Failure, COPD, Coronary Artery Disease, Hypertension    External Notes reviewed:    Previous Clinic Note: Patient was seen by surgery on 3/24/2023 for abdominal pain with plan for EGD.      The following orders were placed and all results were independently analyzed by me:  Orders Placed This Encounter   Procedures    Blood Culture - Blood,    Blood Culture - Blood,    XR Chest 1 View    CT Head Without Contrast    XR Abdomen KUB    ABG with Co-Ox and Electrolytes    Magnesium    Comprehensive Metabolic Panel    CBC Auto Differential    aPTT    CK    Protime-INR    Urinalysis With Culture If Indicated - Urine, Clean Catch    Lactic Acid, Plasma    BNP    High Sensitivity Troponin T    Manual Differential    High Sensitivity Troponin T 2Hr    STAT Lactic Acid, Reflex    aPTT    CBC Auto Differential    Notify Provider Platelet Count Less Than 76610    Notify Provider if PTT Not in Therapeutic Range After 24 Hours    Stop Infusion & Notify Provider if Bleeding Occurs    RN To Release aPTT  Order 6 Hours After Heparin Bolus & 6 Hours After Any Heparin Rate Change    After 2 Consecutive Therapeutic aPTTs, Obtain aPTT Daily.  If a Rate Adjustment is Necessary, Resume Every 6 Hour aPTT Draws    Target Arousal Level RASS -1 to -2    Inpatient Hospitalist Consult    IP General Consult (Use specialty-specific consult if known)    ECG 12 Lead Chest Pain    ECG 12 Lead Chest Pain    Insert peripheral IV    CBC & Differential    Extra Tubes    Gold Top - SST    CBC & Differential       Medications Given in the Emergency Department:  Medications   sodium chloride 0.9 % flush 10 mL (has no administration in time range)   magnesium sulfate 2 GM/50ML infusion  - ADS Override Pull (has no administration in time range)   EPINEPHrine 5 mg in 250 mL NS infusion (has no administration in time range)   amiodarone 150 mg in 100 mL D5W (loading dose) (has no administration in time range)     Followed by   amiodarone 360 mg in 200 mL D5W infusion (has no administration in time range)     Followed by   amiodarone 360 mg in 200 mL D5W infusion (has no administration in time range)   potassium chloride 10 mEq in 100 mL IVPB (has no administration in time range)   propofol (DIPRIVAN) infusion 10 mg/mL 100 mL (10 mcg/kg/min × 113 kg Intravenous Rate/Dose Change 9/19/23 0023)   heparin bolus from bag 5,000 Units (has no administration in time range)   heparin 60277 units/250 mL (100 units/mL) in 0.45 % NaCl infusion (has no administration in time range)   heparin bolus from bag 4,000 Units (has no administration in time range)   heparin bolus from bag 2,000 Units (has no administration in time range)   EPINEPHrine (ADRENALIN) injection (1 mg Intravenous Given 9/18/23 2246)   sodium bicarbonate injection 8.4% (50 mEq Intravenous Given 9/18/23 2233)   sodium chloride 0.9 % bolus 1,000 mL (1,000 mL Intravenous New Bag 9/18/23 2228)   magnesium sulfate injection (2 g Intravenous Given 9/18/23 2236)        ED Course:    ED Course as  of 09/19/23 0023   Mon Sep 18, 2023   2327 ECG 12 Lead Chest Pain  Wide complex QRS with rate of 60.  ST elevation in lead III and aVF.  EKG interpreted by me. [LD]   2327 ECG 12 Lead Chest Pain  Ectopic atrial tachycardia rate of 125.  Interventricular conduction delay.  ST elevation in lead III and aVF.  EKG interpreted by me [LD]   2328 EKG at 9/18/2023 at 20 3:10 PM showed sinus tachycardia with rate of 104.  Prolonged RI interval.  Intraventricular conduction delay.  ST elevation in lead III.  Normal QTc.  EKG interpreted by me. [LD]      ED Course User Index  [LD] Len Lindquist MD       Labs:    Lab Results (last 24 hours)       Procedure Component Value Units Date/Time    ABG with Co-Ox and Electrolytes [[id number]]  (Abnormal) Collected: 09/18/23 2222    Specimen: Arterial Blood Updated: 09/18/23 2307     pH, Arterial 7.033 pH units      pCO2, Arterial 90.3 mm Hg      pO2, Arterial 60.7 mm Hg      HCO3, Arterial 23.5 mmol/L      Base Excess, Arterial -9.5 mmol/L      O2 Saturation, Arterial 77.1 %      Hemoglobin, Blood Gas 15.0 g/dL      Carboxyhemoglobin 0.5 %      Methemoglobin 0.10 %      Oxyhemoglobin 76.6 %      FHHB 22.8 %      Kvng's Test Positive     Note patient being bagged by ambu bag     Site Arterial: right brachial     Modality Ambu bag     FIO2 100 %      Flow Rate 15 lpm      Sodium, Arterial 145.4 mmol/L      Potassium, Arterial 2.71 mmol/L      Ionized Calcium, Arterial 1.12 mmol/L      Chloride, Arterial 98 mmol/L      Glucose, Arterial 289 mg/dL      Lactate, Arterial 10.18 mmol/L      PO2/FIO2 61    Magnesium [[id number]]  (Abnormal) Collected: 09/18/23 2302    Specimen: Blood Updated: 09/18/23 2337     Magnesium 2.9 mg/dL     Comprehensive Metabolic Panel [[id number]]  (Abnormal) Collected: 09/18/23 2302    Specimen: Blood Updated: 09/18/23 2337     Glucose 303 mg/dL      BUN 26 mg/dL      Creatinine 1.90 mg/dL      Sodium 141 mmol/L      Potassium 2.7 mmol/L      Comment: Slight  hemolysis detected by analyzer. Results may be affected.        Chloride 98 mmol/L      CO2 19.8 mmol/L      Calcium 8.9 mg/dL      Total Protein 6.3 g/dL      Albumin 3.9 g/dL      ALT (SGPT) 85 U/L      AST (SGOT) 74 U/L      Comment: Slight hemolysis detected by analyzer. Results may be affected.        Alkaline Phosphatase 76 U/L      Total Bilirubin 0.3 mg/dL      Globulin 2.4 gm/dL      A/G Ratio 1.6 g/dL      BUN/Creatinine Ratio 13.7     Anion Gap 23.2 mmol/L      eGFR 39.9 mL/min/1.73     Narrative:      GFR Normal >60  Chronic Kidney Disease <60  Kidney Failure <15      CBC & Differential [[id number]]  (Abnormal) Collected: 09/18/23 2302    Specimen: Blood Updated: 09/18/23 2331    Narrative:      The following orders were created for panel order CBC & Differential.  Procedure                               Abnormality         Status                     ---------                               -----------         ------                     CBC Auto Differential[[id number]]        Abnormal            Final result               Scan Slide[[id number]]                                                                    Please view results for these tests on the individual orders.    CBC Auto Differential [[id number]]  (Abnormal) Collected: 09/18/23 2302    Specimen: Blood Updated: 09/18/23 2331     WBC 22.07 10*3/mm3      RBC 4.85 10*6/mm3      Hemoglobin 14.9 g/dL      Hematocrit 46.0 %      MCV 94.8 fL      MCH 30.7 pg      MCHC 32.4 g/dL      RDW 12.4 %      RDW-SD 43.1 fl      MPV 9.1 fL      Platelets 178 10*3/mm3     aPTT [[id number]]  (Normal) Collected: 09/18/23 2302    Specimen: Blood Updated: 09/18/23 2325     PTT 32.3 seconds     CK [[id number]]  (Normal) Collected: 09/18/23 2302    Specimen: Blood Updated: 09/18/23 2336     Creatine Kinase 187 U/L     Protime-INR [[id number]]  (Abnormal) Collected: 09/18/23 2302    Specimen: Blood Updated: 09/18/23 2325     Protime 15.0 Seconds      INR 1.17    Narrative:       Suggested Therapeutic Ranges For Oral Anticoagulant Therapy:  Level of Therapy                      INR Target Range  Standard Dose                            2.0-3.0  High Dose                                2.5-3.5  Patients not receiving anticoagulant  Therapy Normal Range                     0.86-1.15    Lactic Acid, Plasma [[id number]]  (Abnormal) Collected: 09/18/23 2302    Specimen: Blood Updated: 09/18/23 2348     Lactate 10.1 mmol/L     BNP [[id number]]  (Normal) Collected: 09/18/23 2302    Specimen: Blood Updated: 09/18/23 2333     proBNP 200.4 pg/mL     Narrative:      Among patients with dyspnea, NT-proBNP is highly sensitive for the detection of acute congestive heart failure. In addition NT-proBNP of <300 pg/ml effectively rules out acute congestive heart failure with 99% negative predictive value.      High Sensitivity Troponin T [[id number]]  (Abnormal) Collected: 09/18/23 2302    Specimen: Blood Updated: 09/18/23 2348     HS Troponin T 111 ng/L     Narrative:      High Sensitive Troponin T Reference Range:  <10.0 ng/L- Negative Female for AMI  <15.0 ng/L- Negative Male for AMI  >=10 - Abnormal Female indicating possible myocardial injury.  >=15 - Abnormal Male indicating possible myocardial injury.   Clinicians would have to utilize clinical acumen, EKG, Troponin, and serial changes to determine if it is an Acute Myocardial Infarction or myocardial injury due to an underlying chronic condition.         Manual Differential [[id number]]  (Abnormal) Collected: 09/18/23 2302    Specimen: Blood Updated: 09/18/23 2331     Neutrophil % 67.0 %      Lymphocyte % 16.0 %      Monocyte % 7.0 %      Eosinophil % 1.0 %      Basophil % 1.0 %      Bands %  1.0 %      Atypical Lymphocyte % 7.0 %      Neutrophils Absolute 15.01 10*3/mm3      Lymphocytes Absolute 5.08 10*3/mm3      Monocytes Absolute 1.54 10*3/mm3      Eosinophils Absolute 0.22 10*3/mm3      Basophils Absolute 0.22 10*3/mm3      Elliptocytes Slight/1+      Microcytes Slight/1+     Ovalocytes Slight/1+     Smudge Cells Slight/1+     Platelet Estimate Adequate     Large Platelets Slight/1+             Imaging:    No Radiology Exams Resulted Within Past 24 Hours      Differential Diagnosis and Discussion:    Cardiac Arrest: Differential diagnosis includes but is not limited to ventricular tachycardia, ventricular fibrillation, asystole, PEA, respiratory arrest due to hypoxia or metabolic abnormalities.    All labs were reviewed and interpreted by me.  All X-rays impressions were independently interpreted by me.  EKG was interpreted by me.  CT scan radiology impression was interpreted by me.    MDM  Number of Diagnoses or Management Options  Diagnosis management comments: Patient presents emergency department as a CODE BLUE.  On arrival CPR was in progress.  Patient received several doses of epi and bicarb.  In the ER we were able to regain pulses.  Patient did have a run of V. tach and he was defibrillated.  He was given amiodarone.  We did lose pulses several times in the ER.  EKG showed possible ST elevation in inferior leads. I discussed patient with cardiologist on-call Dr. Doherty who states patient being down over 45 minutes and unstable recommend medical management and adding heparin once more stable.  Patient was also started on epi infusion. Discussed patient with critical care physician Dr. Herrmann who will see him in the ICU.  Patient does have an elevated troponin of 111.  Lactic acid also elevated to 10.  Patient was given IV fluids.  He is currently sedated.  Discussed patient with hospitalist and he will be admitted to the ICU for further care.       Amount and/or Complexity of Data Reviewed  Clinical lab tests: reviewed  Tests in the radiology section of CPT®: reviewed  Review and summarize past medical records: yes  Independent visualization of images, tracings, or specimens: yes    Risk of Complications, Morbidity, and/or Mortality  Presenting  problems: high  Management options: high         Critical Care Note: Total Critical Care time of 60 minutes. Total critical care time documented does not include time spent on separately billed procedures for services of nurses or physician assistants. I personally saw and examined the patient. I have reviewed all diagnostic interpretations and treatment plans as written. I was present for the key portions of any procedures performed and the inclusive time noted in any critical care statement. Critical care time includes patient management by me, time spent at the patients bedside,  time to review lab and imaging results, discussing patient care, documentation in the medical record, and time spent with family or caregiver.    Patient Care Considerations:          Consultants/Shared Management Plan:    Hospitalist: I have discussed the case with Dr Rangel who agrees to accept the patient for admission.  Consultant: I have discussed the case with Dr Herrmann who agrees to consult on the patient.  Consultant: I have discussed the case with Dr Matos who states recommend medical management and heparin    Social Determinants of Health:    Patient has presented with family members who are responsible, reliable and will ensure follow up care.      Disposition and Care Coordination:    Admit:   Through independent evaluation of the patient's history, physical, and imperical data, the patient meets criteria for observation/admission to the hospital.        Final diagnoses:   Cardiac arrest        ED Disposition       ED Disposition   Decision to Admit    Condition   --    Comment   --               This medical record created using voice recognition software.             Len Lindquist MD  09/19/23 0023

## 2023-09-19 NOTE — H&P
[facility] HISTORY AND PHYSICAL  Date: 2023   Patient Name: Anival Arevalo  : 1965  MRN: [id number]  Primary Care Physician:  Beto Lama MD  Date of admission: 2023    Subjective   Subjective     Chief Complaint: Outside hospital arrest    HPI:    Anival Arevalo is a 58 y.o. male brought to the emergency department for evaluation of an outside, witnessed cardiac arrest.  Per EMS, patient was at home with family, complaining of chest pain throughout the day, and suddenly collapsed.  CPR was started.  Patient was intubated via EMS in route.  Patient was given 4 rounds of epinephrine via EMS.  EMS reports atrial fibrillation, was subsequently defibrillated, and then had persistent PEA.  Upon arrival to the emergency department, CPR was in process.  ROSC was achieved in the emergency department.  Patient was started on epinephrine drip for blood pressure and cardiogenic shock.      Personal History     Past Medical History:  Past Medical History:   Diagnosis Date    Cancer of kidney     L with partial removal 2018    Chronic HFrEF (heart failure with reduced ejection fraction)  2022     Results for orders placed in visit on 22  Adult Transthoracic Echo Complete w/ Color, Spectral and Contrast if necessary per protocol  Interpretation Summary · Estimated left ventricular EF = 55% · Left ventricular diastolic function was normal.      Colon polyp     COPD (chronic obstructive pulmonary disease)     Coronary artery disease     Hyperlipidemia     Hypertension     Myocardial infarction          Past Surgical History:  Past Surgical History:   Procedure Laterality Date    CARDIAC CATHETERIZATION      CHOLECYSTECTOMY      COLONOSCOPY      CORONARY STENT PLACEMENT      4     ENDOSCOPY N/A 3/29/2023    Procedure: ESOPHAGOGASTRODUODENOSCOPY WITH BIOPSIES;  Surgeon: Khai Gallegos MD;  Location: [facility] ENDOSCOPY;  Service: General;  Laterality: N/A;  SAME AS  "PREOP    EYE SURGERY  1976    NEPHRECTOMY PARTIAL      L         Family History:   Family History   Problem Relation Age of Onset    Cancer Mother     COPD Mother     Diabetes Mother     Cancer Father     COPD Father          Social History:   Social History     Socioeconomic History    Marital status:    Tobacco Use    Smoking status: Former     Packs/day: 3.00     Years: 43.00     Pack years: 129.00     Types: Cigarettes     Start date: 1/1/1968     Quit date: 1/1/2013     Years since quitting: 10.7    Smokeless tobacco: Never   Vaping Use    Vaping Use: Never used   Substance and Sexual Activity    Alcohol use: No    Drug use: Never    Sexual activity: Not Currently         Home Medications:  Cholecalciferol, albuterol, aspirin, carvedilol, cetirizine, fluticasone, hydroCHLOROthiazide, indapamide, levothyroxine, lisinopril, montelukast, nitroglycerin, omeprazole, and rosuvastatin    Allergies:  Allergies   Allergen Reactions    Azithromycin Other (See Comments)     Leaves a really bad taste in his mouth - \"rotting flesh\"    Ezetimibe Rash       Review of Systems   All systems were reviewed and negative except for: Cardiac arrest, chest pain    Objective   Objective     Vitals:   Temp:  [97.5 °F (36.4 °C)] 97.5 °F (36.4 °C)  Heart Rate:  [] 94  Resp:  [15-18] 18  BP: ()/() 160/107  FiO2 (%):  [40 %-100 %] 40 %    Physical Exam    Constitutional: Intubated, sedated, no acute distress   Eyes: Pupils equal, sclerae anicteric, no conjunctival injection   HENT: NCAT, mucous membranes moist   Neck: Supple, no thyromegaly, no lymphadenopathy, trachea midline   Respiratory: Clear to auscultation bilaterally, nonlabored respirations    Cardiovascular: RRR, no murmurs, rubs, or gallops, palpable pedal pulses bilaterally   Gastrointestinal: Positive bowel sounds, soft, nontender, nondistended   Musculoskeletal: No bilateral ankle edema, no clubbing or cyanosis to extremities   Psychiatric: Unable to " be assessed secondary to patient's intubated and sedated status.   Neurologic: Unable to be assessed secondary to patient's intubated and sedated status.   Skin: No rashes     Result Review    Result Review:  I have personally reviewed the results from the time of this admission to 9/19/2023 00:21 EDT and agree with these findings:  [x]  Laboratory  []  Microbiology  [x]  Radiology  [x]  EKG/Telemetry   [x]  Cardiology/Vascular   []  Pathology  [x]  Old records  []  Other:      Assessment & Plan   Assessment / Plan     Assessment/Plan:   Cardiac arrest-cardiology consulted in the ED, recommended against heart catheterization lab at this time, recommend stabilization, medical management with heparinization.  Continue amiodarone.  Acute, inferior MI-plan as above  Acute hypoxic respiratory failure-status post ET tube intubation.  Continue ventilation.  Sedation.  Cardiogenic shock-vasopressors.  Titrate as needed for MAP greater than 65.  Acute kidney injury-likely prerenal from cardiogenic shock and cardiac arrest.  Continue to monitor.  Gentle IV fluid hydration.  No improvement next 24 to 48 hours, consider nephrology consultation.  Severe metabolic acidosis-serial ABGs, consider bicarb pushes versus bicarb drip.  Hypokalemia-replete, recheck  Lactic acidosis-likely clinically significant, secondary to hypoxic respiratory failure, cardiogenic shock.        DVT prophylaxis:  Medical DVT prophylaxis orders are present.    CODE STATUS: Full code    Discussed with patient's family at bedside, patient has a poor prognosis given the length of time he was unresponsive.  Patient's family wished to keep the patient full code, attempt medical management, and get family in to say goodbye if necessary.  Patient's family's questions and concerns addressed at bedside.  Verbalized understanding and in agreement treatment plan.    Admission Status:  I believe this patient meets inpatient status.    Electronically signed by Luther  DO Claire, 09/19/23, 12:21 AM EDT.

## 2023-09-19 NOTE — PLAN OF CARE
Goal Outcome Evaluation:  Plan of Care Reviewed With: patient, spouse        Progress: no change  Outcome Evaluation: Patient is currently on the ventilator at this time with setting at 18/450/5+ and 60% FIO2. Will continue to monitor patient and make adjustments as tolerated.

## 2023-09-19 NOTE — PLAN OF CARE
Goal Outcome Evaluation:     Plan of care reviewed with: Family    Progress: Ongoing, declining    Outcome: GCS 3, no sedation. TTM intitiated at goal temp of 37c. NSR. MAP goal >65. Mechanical Ventilation. NG to LWS 950mL out.   Levo@0.06, LR@75, Amio@0.5 Heparin@8.6, Epi on hold

## 2023-09-19 NOTE — PROCEDURES
Procedure Note: Central Line Placement Procedure Note    Indication: Shock, s/p arrest    Consent obtained: Emergent.    Time Out: performed at bedside    Procedure: The patient was positioned appropriately and the skin over the left femoral vein was prepped with ChloraPrep and draped in sterile fashion.  Local anesthesia over the insertion site was applied with 1% lidocaine.  A large bore needle was then advanced with ultrasound guidance until there was return of dark blood. Guidewire was inserted and site confirmed with ultrasound again. Site was dilated with dilator, and triple lumen catheter was then inserted into the vessel over the guidewire using the Seldinger technique.  All three ports showed good, free flowing blood return and easily flushed with saline.  The catheter was then securely fastened to the skin with sutures and covered with a sterile dressing.      The patient tolerated the procedure well.    Complications: None.    Electronically signed by Hoa Herrmann MD, 9/19/2023, 09:32 EDT.

## 2023-09-19 NOTE — PROGRESS NOTES
"[facility] Clinical Pharmacy Services: Vancomycin Pharmacokinetic Initial Consult Note    Anival Arevalo is a 58 y.o. male who is on day 1 of pharmacy to dose vancomycin for Sepsis.    Consult Information  Consulting Provider: Hoa Herrmann   Planned Duration of Therapy: 7 days   Was Patient Receiving Prior to Admission/Consult?: No  Loading Dose Ordered or Given: 2250 mg on 23 at 0530  PK/PD Target: -600 mg/L.hr   Relevant ID History: no  Other Antimicrobials: Cefepime    Imaging Reviewed?: Yes    Microbiology Data  MRSA PCR performed: No; Result: Not ordered due to excluded indication or presence of suspected abscess  Culture/Source:       Vitals/Labs  Ht: 175.3 cm (69\"); Wt: 99.9 kg (220 lb 3.8 oz)  Temp (24hrs), Av °F (37.2 °C), Min:97.5 °F (36.4 °C), Max:101.1 °F (38.4 °C)   Estimated Creatinine Clearance: 48.2 mL/min (A) (by C-G formula based on SCr of 1.9 mg/dL (H)).       Results from last 7 days   Lab Units 23  2302   CREATININE mg/dL 1.90*   WBC 10*3/mm3 22.07*     Assessment/Plan:    Vancomycin Dose:  1250 mg IV every 24 hours; which provides the following predicted parameters:  Exposure target: AUC24 (range)400-600 mg/L.hr   AUC24,ss: 491 mg/L.hr  PAUC*: 72 %  Ctrough,ss: 15.5 mg/L  Pconc*: 27 %  Tox.: 11 %  Vanc Random ordered for  at 1730  Patient has order for Complete Metabolic Panel    Pharmacy will follow patient's kidney function and will adjust doses and obtain levels as necessary. Thank you for involving pharmacy in this patient's care. Please contact pharmacy with any questions or concerns.                           Vinicius Godwin [facility]  Clinical Pharmacist   " Yes

## 2023-09-19 NOTE — SIGNIFICANT NOTE
09/19/23 0900   Spiritual Care   Use of Spiritual Resources non-Rastafari use of spiritual care   Spiritual Care Source  initiative   Spiritual Care Follow-Up will follow closely   Response to Spiritual Care emotion expressed;engaged in conversation;receptive of support;thanks expressed   Spiritual Care Interventions supportive conversation provided   Spiritual Care Visit Type initial   Spiritual Care Request family support;spiritual/moral support   Receptivity to Spiritual Care visit welcomed;unresponsive  (Family Support)     Pt in CCU post cardiac arrest. Pt's wife at bedside during interdisciplinary rounds and support provided.  will continue to follow.

## 2023-09-19 NOTE — PROCEDURES
Arterial Line Placement    Indication: Hemodynamic monitoring, frequent ABGs    A time-out was completed verifying correct patient, procedure, site, positioning, and special equipment if applicable. Kvng’s test was performed to ensure adequate perfusion. The patient’s left groin was prepped and draped in sterile fashion. 1% Lidocaine was used to anesthetize the area. A 18G Arrow arterial line was introduced into the left femoral artery. The catheter was threaded over the guide wire and the needle was removed with appropriate pulsatile blood return. The catheter was then sutured in place to the skin and a sterile dressing applied. Perfusion to the extremity distal to the point of catheter insertion was checked and found to be adequate.    Estimated Blood Loss: <5 mL    The patient tolerated the procedure well and there were no complications.    Electronically signed by Hoa Herrmann MD, 9/19/2023, 09:33 EDT.

## 2023-09-20 NOTE — NURSING NOTE
Jalen called with updated information on patient condition. Please notify Jalen if patient is made DNR or if discussion is started about making patient comfort care. Liliana Bella RN

## 2023-09-20 NOTE — SIGNIFICANT NOTE
09/20/23 0985   Spiritual Care   Spiritual Care Visit Type follow-up   Spiritual Care Source  initiative   Receptivity to Spiritual Care visit welcomed   Spiritual Care Request family support;spiritual/moral support   Spiritual Care Interventions supportive conversation provided   Response to Spiritual Care receptive of support;thanks expressed   Use of Spiritual Resources non-Baptism use of spiritual care   Spiritual Care Follow-Up will follow closely

## 2023-09-20 NOTE — PLAN OF CARE
Goal Outcome Evaluation:  Plan of Care Reviewed With: patient        Progress: no change  Outcome Evaluation: Patient is currently on AC/VC 22/450/+5/40%. Patient does not react to in line suctioning.

## 2023-09-20 NOTE — PLAN OF CARE
Goal Outcome Evaluation:   Levophed and epi weaned off per protocol. No sedation at this time. Q2 turns per protocol. No neuro responses this shift. Family waiting on MRI results to make plan of care decision. EEG today, waiting results. Family updated at bedside. VSS. TTM maintained per protocol. Mel Borjas RN

## 2023-09-20 NOTE — PROGRESS NOTES
[facility]     Progress Note    Patient Name: Anival Arevalo  : 1965  MRN: [id number]  Primary Care Physician:  Beto Lama MD  Date of admission: 2023    Subjective   Subjective       Chief Complaint:   Patient is critically ill in ICU with respiratory failure status postcardiac arrest      This morning,  Remains intubated  Neuro status largely unchanged  Norepinephrine has been off around 830  Epinephrine off overnight  Lactic acid improving      Review of Systems  Unable to obtain    Objective   Objective     Vitals:   Temp:  [98.2 °F (36.8 °C)-99 °F (37.2 °C)] 98.5 °F (36.9 °C)  Heart Rate:  [74-89] 86  FiO2 (%):  [45 %-60 %] 45 %    Physical Exam   Vital Signs Reviewed   General:  NAD.  Lying in bed  HEENT:  PERRL, EOMI.    Neck:  No JVD, no thyromegaly  Lymph: no axillary, cervical, supraclavicular lymphadenopathy noted bilaterally  Chest:  Clear to auscultation bilaterally, no work of breathing noted on mechanical ventilator  CV: RRR, no M/G/R, pulses 2+  Abd:  Soft, NT, ND, +BS  EXT:  no clubbing, no cyanosis, no edema  Neuro:  A&Ox0, no cough, gag reflex, CN grossly intact, no focal deficits.  Skin: No rashes or lesions noted         Result Review    Result Review:  I have personally reviewed the results from the time of this admission to 2023 13:34 EDT and agree with these findings:  []  Laboratory  []  Microbiology  []  Radiology  []  EKG/Telemetry   []  Cardiology/Vascular   []  Pathology  []  Old records  []  Other:  Most notable findings include:     Assessment & Plan   Assessment / Plan     Brief Patient Summary:  Anival Arevalo is a 58 y.o. male with out of hospital cardiac arrest    Active Hospital Problems:  Active Hospital Problems    Diagnosis     **Cardiac arrest       Impression:  Out of hospital cardiac arrest  Ventricular fibrillation  Cardiogenic shock  Acute coronary syndrome  Acute Inferior MI  Altered  mental status  Concern for anoxic brain injury  Acute  hypoxic/hypercarbic respiratory failure requiring mechanical ventilation  Acute kidney injury, likely prerenal secondary to hypotension/shock  Coronary artery disease  Lactic acidosis, clinically significant  Urinary tract infection     Plan:  Neuro  -Remains off sedation, does have abnormal neurological exam, GCS <3  -CT of head concerning for acute anoxic brain injury  -MRI brain ordered  -Rapid EEG ordered yesterday, patient was diaphoretic and unable to attach leads to obtain accurate reading  -Repeat order placed today for rapid EEG  -EEG order put in place as well  -Neurology consult in place for out of hospital cardiac arrest  -Continue Tylenol for fever, attempt to maintain normothermia  -Arctic sun currently in place target normothermia     Pulmonary  -Continue mechanical ventilation ACVC 18/400/5 +, wean FiO2 as tolerated  -Unable to tolerate SBT due to critical state at this time  -Chest x-ray consistent with possible aspiration  -Aspiration precautions  -Sputum culture process.  Continues on Unasyn  -MRSA pcr negative     Cardio  -Out of hospital V-fib/cardiac arrest/cardiogenic shock  -Echocardiogram show EF 39.3% with mild dyssynchronous contraction pattern.  LV diastolic dysfunction noted.  -Cardiology consulted, appreciate assistance  -Continue amiodarone  -Continue heparin drip  -Levo on hold. Titrate to maintain MAP 65 or greater   -Continue Brilinta, aspirin and statin  -Hold beta-blocker at this time   -Continue trend lactic acid     Renal  -Trend renal function monitor replace electrolytes  -Avoid nephrotoxins/hypotension     GI  -NG tube to low wall suction  -Would hold off on tube feeds at this time due to high vasopressor requirement  -Continue Protonix 40 mg IV daily  -Follow LFTs     Endocrine  -Target glucose 140-180 while in ICU  -TSH ok     Heme  -Transfuse if hemoglobin less than 7     ID  -Chest x-ray consistent with right-sided infiltrate possible aspiration  -Urine culture  NGTD  -De-escalate from vancomycin and cefepime to Unasyn for total of 7 days  -Blood culture x2 in process; sputum culture in process  -Add culture to urine in lab, send sputum culture, check MRSA PCR     PUP: protonix  DVT: heparin gtt      CODE STATUS:   Medical Intervention Limits: NO intubation (DNI)  Level Of Support Discussed With: Patient  Code Status (Patient has no pulse and is not breathing): No CPR (Do Not Attempt to Resuscitate)  Medical Interventions (Patient has pulse or is breathing): Limited Support      Rhonda RAMIREZ PA spent 15 minutes in accordance with split shared billing.    Patient is critically ill with respiratory failure requiring mechanical ventilator, out-of-hospital cardiac arrest, shock requiring vasopressors, lactic acidosis, BJORN, lactic acidosis. 35 minutes of critical care time was spent managing this patient, excluding procedures. Of this time, Dr. Hoa RAMIREZ, spent 20 minutes and DESIRE Dean spent 15 minutes in accordance with split shared billing. This included personally reviewing all pertinent labs, imaging, microbiology and documentation. Also discussing the case with the patient and any available family, the admitting physician and any available ancillary staff. Electronically signed by Hoa Herrmann MD, 09/20/23, 4:39 PM EDT.

## 2023-09-20 NOTE — PROGRESS NOTES
[facility]   Hospitalist Progress Note  Date: 2023  Patient Name: Anival Arevalo  : 1965  MRN: [id number]  Date of admission: 2023      Subjective   Subjective     Chief Complaint: Follow up for out-of-hospital cardiac    Interval Followup:   Normothermic, on Arctic Sun  GCS <3  Vasopressors weaned off  Unable to tolerate spontaneous breathing trial    Review of Systems  Unable to obtain.  Unresponsive    Objective   Objective     Vitals:   Temp:  [98.5 °F (36.9 °C)-99 °F (37.2 °C)] 98.5 °F (36.9 °C)  Heart Rate:  [74-89] 88  FiO2 (%):  [45 %-60 %] 45 %  Physical Exam    Constitutional: Critically ill appearing male, on mechanical ventilator   Eyes: Pupils equal, minimally responsive, disconjugate gaze   HENT: NCAT, nares patent, OP with ET tube   Respiratory: Coarse equal ventilated breath sounds bilaterally   Cardiovascular: RRR, no murmurs, no edema   Gastrointestinal: Positive bowel sounds, soft, nondistended   Neurologic: Obtunded, no response to tactile or verbal stimuli, no focal deficit on limited cranial nerve exam   Skin: Extremities warm, no rashes or open wounds    Result Review    Result Review:  I have personally reviewed the following over the last 24 hours (07:00 to 07:00) and agree with the following findings  [x]  Laboratory  CBC          2023    23:02 2023    05:39 2023    09:50 2023    18:00 2023    04:18   CBC   WBC 22.07  26.12  20.61  12.04  8.65    RBC 4.85  5.31  5.16  5.22  4.76    Hemoglobin 14.9  15.7  16.0  15.5  14.5    Hematocrit 46.0  50.9  47.5  47.1  42.0    MCV 94.8  95.9  92.1  90.2  88.2    MCH 30.7  29.6  31.0  29.7  30.5    MCHC 32.4  30.8  33.7  32.9  34.5    RDW 12.4  12.4  12.6  12.4  12.7    Platelets 178  286  242  173  121        [x]  Microbiology  Blood culture no growth  Respiratory culture normal vania  Urine culture no growth  [x]  Radiology  []  EKG/Telemetry monitor personally reviewed: NSR/sinus tachycardia  []   Cardiology/Vascular   []  Pathology  []  Old records  [x]  Other:    Intake/Output Summary (Last 24 hours) at 9/20/2023 1540  Last data filed at 9/20/2023 1500  Gross per 24 hour   Intake 3255.8 ml   Output 2209 ml   Net 1046.8 ml         Assessment & Plan   Assessment / Plan     Assessment/Plan:  Out of hospital cardiac arrest  Ventricular fibrillation  Acute inferior MI  Cardiogenic shock  Acute hypoxic respiratory failure requiring mechanical ventilation  Concern for anoxic brain injury  Aspiration pneumonia  Lactic acidosis, clinically significant  Acute kidney injury  Therapeutic drug monitoring: IV heparin  Transaminitis  Hypocalcemia       Continue ICU level care.  Appreciate intensivist recommendation  Has been off sedatives and neurological status remains very poor. Concern for anoxic brain injury; EEG and MRI brain WO contrast pending.  Maintain normothermia with Arctic sun  Vent management per intensivist  Vasopressors support prn to maintain MAP >65  Appreciate cardiology recommendations.  TTE reporting LV dysfunction with drop in EF to ~40%. Cardiac cath deferred on admission  Continue heparin drip per ACS protocol  Continue IV amiodarone drip  Continue aspirin 81 mg daily and Brilinta 60 mg twice daily  Continue high intensity statin  All preliminary cultures no growth.  Continue IV Unasyn.  Creatinine improved.  Nonoliguric.  Continue Hood catheter.  Trend renal function and electrolytes.  Blood sugars at goal 140-180.  Continue low-dose SSI/Accu-Cheks every 6 hours  Nutrition: Strict n.p.o.  GI ppx: IV protonix  VTE prophylaxis: IV heparin  Prognosis guarded    Discussed plan with intensivist.    DVT prophylaxis:  Medical DVT prophylaxis orders are present.    CODE STATUS:   Medical Intervention Limits: NO intubation (DNI)  Level Of Support Discussed With: Patient  Code Status (Patient has no pulse and is not breathing): No CPR (Do Not Attempt to Resuscitate)  Medical Interventions (Patient has  pulse or is breathing): Limited Support    58-year-old male critically in the ICU status post out-of-hospital cardiac arrest with cardiogenic shock, respiratory failure, concern for anoxic brain injury.I spent 40 minutes of time for evaluation and management of this critically ill patient including review of chart, labs pertinent imaging available as well as medical decision making and discussion with physicians, staff and patient.     Electronically signed by Jarred Rich DO, 09/20/23, 3:37 PM EDT.

## 2023-09-20 NOTE — PLAN OF CARE
Goal Outcome Evaluation:  Plan of Care Reviewed With: spouse        Progress: no change  Outcome Evaluation: Patient continues to have not response to stimuli or pain. No pupillary responses, No neurologic changes. Levo is at 0.04. Amio at 0.5, Heparin titrated per protocol, LR at 75. He remains on the artic sun, temp is 98.9. Vent 22/450/5/45%. [facility] is following. Patient is now a DNR. [facility] rep will be on site or call this morning.  Liliana Bella RN

## 2023-09-20 NOTE — PLAN OF CARE
Goal Outcome Evaluation:  Plan of Care Reviewed With: patient        Progress: no change             Ambulatory

## 2023-09-20 NOTE — CONSULTS
Cardiac Rehab Phase I - Occurrence #1    Education Topics:  Cardiac Rehab no     Topic Components:  Exercise no     Teaching Aids:  Phase 2 Brochure no     Teaching Method:  Written Instruction no     Teaching Recipient:  Patient no       Recovery/Discharge Instructions:  Cardiac Rehab Phase 2 no       Patient Qualification:  Cardiac Rehab Phase 2 no   Discussed with patient nurse about cardiac rehab. Did not leave information  Due to condition and prognosis at this time. Discussed with nurse about   Consult if condition changes.

## 2023-09-21 NOTE — CONSULTS
23 1100 23 1146   Spiritual Care   Spiritual Care Visit Type other (see comments)  (Comfort Measures Only) other (see comments)  (Comfort Measures Only)   Spiritual Care Source  initiative  initiative   Receptivity to Spiritual Care visit welcomed visit welcomed   Spiritual Care Request hospitality support;family support;end-of-life issue(s) support hospitality support;family support;end-of-life issue(s) support   Spiritual Care Interventions end-of-life care assistance provided end-of-life care assistance provided   Response to Spiritual Care receptive of support receptive of support   Use of Spiritual Resources non-[employer] use of spiritual care non-[employer] use of spiritual care  (list of  homes provided to pt's wife and number for security given to call when  Home selected)   Spiritual Care Follow-Up will follow closely  --      Pt's family asked to transition pt to comfort measures only. Support provided to pt's wife and children at bedside.    Pt  at 1146 and bereavement support provided. List of area  homes provided along with [facility] [employer] phone number to call with selection.

## 2023-09-21 NOTE — PLAN OF CARE
Goal Outcome Evaluation:   Patient remains on ventilator on set rate at this time. Patient settings at this time are 22r 450vt and +5p. Patient failed sbt this am, will reassess come 0500.

## 2023-09-21 NOTE — PROGRESS NOTES
Pt admitted to CCU 2 days ago requiring intubation s/p cardiac arrest. Per neurology note, pt with anoxic brain damage. Patient has transitioned to comfort measures only. RD will sign off at this time. If further clinical nutrition services are desired, please re-consult.

## 2023-09-21 NOTE — PROGRESS NOTES
[facility]     Progress Note    Patient Name: Anival Arevalo  : 1965  MRN: [id number]  Primary Care Physician:  Beto Lama MD  Date of admission: 2023    Subjective   Subjective       Chief Complaint:   Patient is critically ill in ICU with respiratory failure status postcardiac arrest    Over the past 24 hours,  Remains intubated  Neuro status largely unchanged  Norepinephrine has been off around 830  Epinephrine off overnight  Lactic acid improving    Overnight  MRI obtained with findings of diffuse anoxic brain injury  EEG with low to medium voltage polyspike discharges which are generalized and synchronous followed by myoclonic jerk of head, nonreactive background activity compatible with severe diffuse encephalopathy    Today  Remains intubated on mechanical ventilation  Neuro status remains unchanged  Continues off pressor therapy  UOP x24 hours 1.3 L; +3.9 L fluid balance since admission  Afebrile    Review of Systems  Unable to obtain    Objective   Objective     Vitals:   Temp:  [97.8 °F (36.6 °C)-99.3 °F (37.4 °C)] 97.8 °F (36.6 °C)  Heart Rate:  [65-90] 84  FiO2 (%):  [24 %-45 %] 24 %    Physical Exam   Vital Signs Reviewed   General:  NAD. Lying in bed. Family at bedside  HEENT:  PERRL, EOMI.    Neck:  No JVD, no thyromegaly  Lymph: no axillary, cervical, supraclavicular lymphadenopathy noted bilaterally  Chest:  Clear to auscultation bilaterally, no work of breathing noted  CV: RRR, no M/G/R, pulses 2+  Abd:  Soft, NT, ND, +BS  EXT:  no clubbing, no cyanosis, no edema  Neuro:  A&Ox0, does not respond to verbal or pain stimuli, CN grossly intact, no focal deficits.  Skin: No rashes or lesions noted    Result Review    Result Review:  I have personally reviewed the results from the time of this admission to 2023 12:51 EDT and agree with these findings:  []  Laboratory  []  Microbiology  []  Radiology  []  EKG/Telemetry   []  Cardiology/Vascular   []  Pathology  []  Old  records  []  Other:  Most notable findings include:       Lab 09/21/23  0404 09/20/23  1917 09/20/23  1825 09/20/23  0643 09/20/23  0418 09/19/23  1800 09/19/23  1232 09/19/23  0950 09/19/23  0728 09/19/23  0539 09/19/23  0511 09/19/23  0351 09/18/23  2302   WBC 7.57  --  6.41  --  8.65 12.04*  --  20.61*  --  26.12*  --   --  22.07*   HEMOGLOBIN 11.8*  --  12.8*  --  14.5 15.5  --  16.0  --  15.7  --   --  14.9   HEMATOCRIT 36.8*  --  38.8  --  42.0 47.1  --  47.5  --  50.9  --   --  46.0   PLATELETS 115*  --  105*  --  121* 173  --  242  --  286  --   --  178   SODIUM 138 138  --   --  137 138  --  141  --   --  142  --  141   SODIUM, ARTERIAL  --   --   --  135.6*  --   --  139.9  --    < >  --   --    < >  --    POTASSIUM 4.0 4.1  --   --  4.6 5.2  --  4.1  --   --  3.4*  --  2.7*   CHLORIDE 106 104  --   --  106 105  --  108*  --   --  101  --  98   CO2 25.1 24.1  --   --  18.8* 17.6*  --  15.0*  --   --  17.7*  --  19.8*   BUN 47* 45*  --   --  47* 43*  --  38*  --   --  29*  --  26*   CREATININE 1.31* 1.54*  --   --  1.81* 2.14*  --  2.37*  --   --  2.01*  --  1.90*   GLUCOSE 153* 154*  --   --  150* 144*  --  164*  --   --  293*  --  303*   GLUCOSE, ARTERIAL  --   --   --  139*  --   --  189*  --    < >  --   --    < >  --    CALCIUM 8.3* 8.3*  --   --  7.8* 8.0*  --  7.4*  --   --  8.1*  --  8.9   PHOSPHORUS 2.3*  --  2.9  --  3.2 4.9*  --  4.2  --   --  5.7*  --   --    TOTAL PROTEIN 5.5* 5.4*  --   --  5.7* 6.1  --  5.9*  --   --  6.7  --  6.3   ALBUMIN 2.9* 2.9*  --   --  3.2* 3.6  --  3.6  --   --  4.1  --  3.9   GLOBULIN 2.6 2.5  --   --  2.5 2.5  --  2.3  --   --  2.6  --  2.4    < > = values in this interval not displayed.     MRSA PCR negative  Blood culture NGTD  Urine culture NGTD  Sputum culture NGTD    Assessment & Plan   Assessment / Plan     Brief Patient Summary:  Anival Arevalo is a 58 y.o. male with out of hospital cardiac arrest    Active Hospital Problems:  Active Hospital Problems     Diagnosis     **Cardiac arrest       Impression:  Out of hospital cardiac arrest  Ventricular fibrillation  Cardiogenic shock  Acute coronary syndrome  Acute Inferior MI  Altered  mental status  Concern for anoxic brain injury  Acute hypoxic/hypercarbic respiratory failure requiring mechanical ventilation  Acute kidney injury, likely prerenal secondary to hypotension/shock  Coronary artery disease  Lactic acidosis, clinically significant  Urinary tract infection     Plan:  Neuro  -Remains off sedation, does have abnormal neurological exam, GCS <3  -CT of head concerning for acute anoxic brain injury  -MRI brain reviewed  -EEG reviewed  -Neurology evaluated patient today.   -Continue Tylenol for fever, attempt to maintain normothermia  -Arctic sun currently in place target normothermia     Pulmonary  -Continue mechanical ventilation ACVC 18/400/5 +, wean FiO2 as tolerated  -Unable to tolerate SBT due to critical state at this time  -Chest x-ray consistent with possible aspiration  -Aspiration precautions  -Sputum culture NGTD.  Continues on Unasyn  -MRSA pcr negative     Cardio  -Out of hospital V-fib/cardiac arrest/cardiogenic shock  -Echocardiogram show EF 39.3% with mild dyssynchronous contraction pattern.  LV diastolic dysfunction noted.  -Cardiology consulted, appreciate assistance  -Continue amiodarone  -Continue heparin drip  -Levo on hold. Titrate to maintain MAP 65 or greater   -Continue Brilinta, aspirin and statin  -Hold beta-blocker at this time   -Continue trend lactic acid     Renal  -Trend renal function monitor replace electrolytes  -Avoid nephrotoxins/hypotension     GI  -NG tube to low wall suction  -Would hold off on tube feeds at this time due to high vasopressor requirement  -Continue Protonix 40 mg IV daily  -Follow LFTs     Endocrine  -Target glucose 140-180 while in ICU  -TSH ok     Heme  -Transfuse if hemoglobin less than 7     ID  -Chest x-ray consistent with right-sided infiltrate possible  aspiration  -Urine culture NGTD  -Continue Unasyn for total of 7 days  -Blood culture x2 with NGTD, sputum culture NGTD     PUP: protonix  DVT: heparin gtt    MRI and EEG were discussed with patient's family today.  Neurology evaluated patient today with findings of suspect poor prognosis for meaningful neurological recovery considering MRI of brain and EEG findings.  Patient was DNR status.  Family has decided to proceed forward with withdrawal of care once all of family is available.  When everyone is here, nursing staff will notify MD and we can proceed forward with comfort care orders.    CODE STATUS:   Level Of Support Discussed With: Next of Kin (If No Surrogate)  Code Status (Patient has no pulse and is not breathing): No CPR (Do Not Attempt to Resuscitate)  Medical Interventions (Patient has pulse or is breathing): Comfort Measures    Rhonda RAMIREZ PA spent 15 minutes in accordance with split shared billing.  Electronically signed by DESIRE Montano, 09/21/23, 12:58 PM EDT.    Patient is critically ill with respiratory failure requiring mechanical ventilator, out-of-hospital arrest, anoxic brain injury. 35 minutes of critical care time was spent managing this patient, excluding procedures. Of this time, Dr. Hoa RAMIREZ, spent 20 minutes and DESIRE Dean spent 15 minutes in accordance with split shared billing. This included personally reviewing all pertinent labs, imaging, microbiology and documentation. Also discussing the case with the patient and any available family, the admitting physician and any available ancillary staff.   Electronically signed by Hoa Herrmann MD, 09/21/23, 2:55 PM EDT.

## 2023-09-21 NOTE — DISCHARGE SUMMARY
[facility]        HOSPITALIST  DEATH DISCHARGE SUMMARY    Patient Name: Anival Arevalo  : 1965  MRN: [id number]    Date of Admission: 2023  Date of Death:  23  Time of Death:  11:46  Primary Care Physician: Beto Lama MD    Consults       Date and Time Order Name Status Description    2023  7:24 AM Inpatient Neurology Consult General Completed     2023 11:42 PM IP General Consult (Use specialty-specific consult if known)      2023 11:42 PM Inpatient Hospitalist Consult              Discharge Diagnoses:  Out of hospital cardiac arrest  Ventricular fibrillation  Acute inferior MI  Cardiogenic shock  Acute hypoxic respiratory failure requiring mechanical ventilation  Anoxic brain injury  Aspiration pneumonia  Metabolic encephalopathy  Lactic acidosis, clinically significant  Acute kidney injury  Therapeutic drug monitoring: IV heparin  Transaminitis  Hypocalcemia    Hospital Course     Hospital Course:  Anival Arevalo is a 58 y.o. male with history of coronary artery disease, hypertension, hyperlipidemia, COPD, heart failure with reduced ejection fraction who suffered out-of-hospital witnessed cardiac arrest. Per family, patient began to experience chest pain and collapsed.  CPR was initiated.  He received defibrillation for ventricular fibrillation per EMS.  ROSC was achieved after about 13 minutes.  EKG was consistent with acute inferior myocardial infarction.  Cardiology was consulted but deferred cardiac catheterization. Patient had severe lactic acidosis and was in renal failure.  He was admitted to the intensive care unit on mechanical ventilator, vasopressor support, amiodarone and heparin drips.  CT scan raise concern for anoxic brain injury and this was confirmed on MRI.  EEG reported generalized polyspike discharges, severe diffuse encephalopathy.  Goals of care, poor prognosis discussed with family.  Family ultimately decided to withdraw care  and comfort care measures were pursued.  Patient subsequently passed at 1146 on 9/21/2023.  May he rest in peace    Discharge Disposition:  [facility]    Electronically signed by Jarred Rich DO, 09/21/23, 11:57 AM EDT.

## 2023-09-21 NOTE — PLAN OF CARE
Goal Outcome Evaluation:  Plan of Care Reviewed With: spouse        Progress: no change  Outcome Evaluation: Patient spouse notified of MRI reading, she was anxious and unable to rest. It has been confirmed he has an anoxic brain injury and the brain functionality has . She understand and is coming to terms. She is waiting on sons to arrive to officially make him comfort care. Jalen has been called and notified at 0549 and will be calling back. Plan of care will change. Liliana Bella RN           Problem: Adult Inpatient Plan of Care  Goal: Plan of Care Review  Outcome: Unable to Meet, Plan Revised  Goal: Patient-Specific Goal (Individualized)  Outcome: Unable to Meet, Plan Revised  Goal: Absence of Hospital-Acquired Illness or Injury  Outcome: Unable to Meet, Plan Revised  Goal: Optimal Comfort and Wellbeing  Outcome: Unable to Meet, Plan Revised  Goal: Readiness for Transition of Care  Outcome: Unable to Meet, Plan Revised     Problem: Skin Injury Risk Increased  Goal: Skin Health and Integrity  Outcome: Unable to Meet, Plan Revised     Problem: Fall Injury Risk  Goal: Absence of Fall and Fall-Related Injury  Outcome: Unable to Meet, Plan Revised     Problem: Cardiac Impairment  Goal: Optimal Activity Tolerance  Outcome: Unable to Meet, Plan Revised     Problem: Communication Impairment (Mechanical Ventilation, Invasive)  Goal: Effective Communication  Outcome: Unable to Meet, Plan Revised     Problem: Device-Related Complication Risk (Mechanical Ventilation, Invasive)  Goal: Optimal Device Function  Outcome: Unable to Meet, Plan Revised     Problem: Inability to Wean (Mechanical Ventilation, Invasive)  Goal: Mechanical Ventilation Liberation  Outcome: Unable to Meet, Plan Revised     Problem: Nutrition Impairment (Mechanical Ventilation, Invasive)  Goal: Optimal Nutrition Delivery  Outcome: Unable to Meet, Plan Revised     Problem: Skin and Tissue Injury (Mechanical Ventilation, Invasive)  Goal: Absence of  Device-Related Skin and Tissue Injury  Outcome: Unable to Meet, Plan Revised     Problem: Ventilator-Induced Lung Injury (Mechanical Ventilation, Invasive)  Goal: Absence of Ventilator-Induced Lung Injury  Outcome: Unable to Meet, Plan Revised     Problem: Adjustment to Illness (Sepsis/Septic Shock)  Goal: Optimal Coping  Outcome: Unable to Meet, Plan Revised     Problem: Bleeding (Sepsis/Septic Shock)  Goal: Absence of Bleeding  Outcome: Unable to Meet, Plan Revised     Problem: Glycemic Control Impaired (Sepsis/Septic Shock)  Goal: Blood Glucose Level Within Desired Range  Outcome: Unable to Meet, Plan Revised     Problem: Infection Progression (Sepsis/Septic Shock)  Goal: Absence of Infection Signs and Symptoms  Outcome: Unable to Meet, Plan Revised     Problem: Nutrition Impaired (Sepsis/Septic Shock)  Goal: Optimal Nutrition Intake  Outcome: Unable to Meet, Plan Revised

## 2023-09-21 NOTE — CONSULTS
Inpatient Neurology Consult General  Consult performed by: Deni Rayo MD  Consult ordered by: Hoa Herrmann MD      [facility]  [facility] Tele[facility] Consult Services    Routine Consult New    Patient Name:   Anival Arevalo  YOB: 1965  Identification Number:   MRN - [id number]  Date of Service:   09/21/2023 08:55:23    Diagnosis        G93.1 - Anoxic brain damage, not elsewhere classified    Impression  Anival Arevalo is a 58 y.o. male brought to the emergency department for evaluation of an outside, witnessed cardiac arrest.    Findings:  MRI Brain: diffuse anoxic injury.  EEG: generalized polyspike discharges, severe diffuse encephalopathy.    1. Cardiac arrest with anoxic brain injury. suspect poor prognosis for meaningful neurological recovery considering MRI Brain and EEG findings.    Plan:  Family decided to withdraw care. I discussed possibility of trying an AED but family would like to withdraw care.  Continue delirium precautions.  Toxic metabolic work up per primary team.  Neurology will sign off. Please reconsult if needed.      Our recommendations are outlined below    Nursing Recommendations :  Delirium precautions: Blinds open during the day, closed at night, frequent reorientation, minimize nighttime interruptionsWhen possible avoid benzodiazepines, opioid pain medications, and anticholinergic medications    Consultations :  Toxic metabolic work up per primary team    Disposition :  Neurology will sign off. Reconsult if Needed.    ----------------------------------------------------------------------------------------------------    Imaging  EEG:  Abnormal EEG because of:  1. Intermittent low to medium voltage polyspike discharges which are generalized and synchronous followed by myoclonic jerk of the head.  2. Nonreactive background activity compatible with severe diffuse encephalopathy.    Chief Complaint:  s/p cardiac arrest    History of Present  Illness:  Patient is a 58 year old Male.  Per admission Note:  Anival Arevalo is a 58 y.o. male brought to the emergency department for evaluation of an outside, witnessed cardiac arrest. Per EMS, patient was at home with family, complaining of chest pain throughout the day, and suddenly collapsed. CPR was started. Patient was intubated via EMS in route. Patient was given 4 rounds of epinephrine via EMS. EMS reports atrial fibrillation, was subsequently defibrillated, and then had persistent PEA. Upon arrival to the emergency department, CPR was in process. ROSC was achieved in the emergency department. Patient was started on epinephrine drip for blood pressure and cardiogenic shock.    Neurology  Family would like to withdraw care. MRI shows diffuse brain injusry. .  EEG shows epileptiform discharges          Past Medical History:       There is no history of Stroke    Medications:    Anticoagulant use:  Unknown  Antiplatelet use: Unknown  Reviewed EMR for current medications    Allergies:   Reviewed    Social History:  Unable To Obtain Due To Patient Status : Patient Is Obtunded/ Comatose    Family History:    There is no family history of premature cerebrovascular disease pertinent to this consultation    ROS : ROS Cannot Be Obtained Because:  Patient Is Obtunded/ Comatose    Past Surgical History:  There Is No Surgical History Contributory To Today’s Visit    Examination    Coma Exam:    Ventilator:  Yes  Sedated: No  Paralyzed: No  Breathes spontaneously above ventilator rate: No    Responds to Voice:  No    Responds to Sternal rub:  No    Withdraws limb from painful stimulation:  No    Spontaneous limb movement:   Right arm: No  Left arm: No  Right leg: No  Left leg: No    Pupils:  Equal and Reactive    Corneal Reflexes:  Absent    Oculocephalic Reflexes:  Absent          Patient/Family was informed the Neurology Consult would happen via TeleHealth consult by way of interactive audio and video  [employer] and consented to receiving care in this manner.    Telehealth Neurology consultation was provided. I spent 30 minutes providing telehealth care. This includes time spent for face to face visit via telemedicine, review of medical records, imaging studies and discussion of findings with providers, the patient and/or family.      Dr Deni Rayo      [facility]  For Inpatient follow-up with [facility] physician please call [facility] [phone]. This is not an outpatient service. Post hospital discharge, please contact hospital directly.

## 2023-09-24 LAB — BACTERIA SPEC AEROBE CULT: NORMAL

## 2023-09-25 LAB
BACTERIA SPEC AEROBE CULT: NORMAL
GLUCOSE BLDC GLUCOMTR-MCNC: 241 MG/DL (ref 70–99)

## (undated) DEVICE — Device

## (undated) DEVICE — SOL IRR NACL 0.9PCT BT 1000ML

## (undated) DEVICE — CONN JET HYDRA H20 AUXILIARY DISP

## (undated) DEVICE — SINGLE-USE BIOPSY FORCEPS: Brand: RADIAL JAW 4

## (undated) DEVICE — GLV SURG BIOGEL LTX PF 7 1/2

## (undated) DEVICE — Device: Brand: DEFENDO AIR/WATER/SUCTION AND BIOPSY VALVE

## (undated) DEVICE — SOL IRRG H2O PL/BG 1000ML STRL

## (undated) DEVICE — BLCK/BITE BLOX WO/DENTL/RIM W/STRAP 54F

## (undated) DEVICE — LINER SURG CANSTR SXN S/RIGD 1500CC

## (undated) DEVICE — SOLIDIFIER LIQLOC PLS 1500CC BT